# Patient Record
Sex: FEMALE | Race: WHITE | NOT HISPANIC OR LATINO | Employment: OTHER | URBAN - METROPOLITAN AREA
[De-identification: names, ages, dates, MRNs, and addresses within clinical notes are randomized per-mention and may not be internally consistent; named-entity substitution may affect disease eponyms.]

---

## 2023-04-11 LAB
ABO GROUP (TYPE) IN BLOOD: NORMAL
ALANINE AMINOTRANSFERASE (SGPT) (U/L) IN SER/PLAS: 12 U/L (ref 7–45)
ALBUMIN (G/DL) IN SER/PLAS: 4.2 G/DL (ref 3.4–5)
ALKALINE PHOSPHATASE (U/L) IN SER/PLAS: 54 U/L (ref 33–136)
ANION GAP IN SER/PLAS: 10 MMOL/L (ref 10–20)
ANTIBODY SCREEN: NORMAL
ASPARTATE AMINOTRANSFERASE (SGOT) (U/L) IN SER/PLAS: 14 U/L (ref 9–39)
BILIRUBIN TOTAL (MG/DL) IN SER/PLAS: 0.5 MG/DL (ref 0–1.2)
CALCIUM (MG/DL) IN SER/PLAS: 9.7 MG/DL (ref 8.6–10.3)
CARBON DIOXIDE, TOTAL (MMOL/L) IN SER/PLAS: 31 MMOL/L (ref 21–32)
CHLORIDE (MMOL/L) IN SER/PLAS: 102 MMOL/L (ref 98–107)
CREATININE (MG/DL) IN SER/PLAS: 0.66 MG/DL (ref 0.5–1.05)
ERYTHROCYTE DISTRIBUTION WIDTH (RATIO) BY AUTOMATED COUNT: 13.8 % (ref 11.5–14.5)
ERYTHROCYTE MEAN CORPUSCULAR HEMOGLOBIN CONCENTRATION (G/DL) BY AUTOMATED: 32.3 G/DL (ref 32–36)
ERYTHROCYTE MEAN CORPUSCULAR VOLUME (FL) BY AUTOMATED COUNT: 87 FL (ref 80–100)
ERYTHROCYTES (10*6/UL) IN BLOOD BY AUTOMATED COUNT: 5.07 X10E12/L (ref 4–5.2)
GFR FEMALE: >90 ML/MIN/1.73M2
GLUCOSE (MG/DL) IN SER/PLAS: 84 MG/DL (ref 74–99)
HEMATOCRIT (%) IN BLOOD BY AUTOMATED COUNT: 44.3 % (ref 36–46)
HEMOGLOBIN (G/DL) IN BLOOD: 14.3 G/DL (ref 12–16)
LEUKOCYTES (10*3/UL) IN BLOOD BY AUTOMATED COUNT: 6.9 X10E9/L (ref 4.4–11.3)
NRBC (PER 100 WBCS) BY AUTOMATED COUNT: 0 /100 WBC (ref 0–0)
PLATELETS (10*3/UL) IN BLOOD AUTOMATED COUNT: 244 X10E9/L (ref 150–450)
POTASSIUM (MMOL/L) IN SER/PLAS: 4 MMOL/L (ref 3.5–5.3)
PROTEIN TOTAL: 7.2 G/DL (ref 6.4–8.2)
RH FACTOR: NORMAL
SODIUM (MMOL/L) IN SER/PLAS: 139 MMOL/L (ref 136–145)
UREA NITROGEN (MG/DL) IN SER/PLAS: 15 MG/DL (ref 6–23)

## 2023-04-13 LAB — STAPH/MRSA SCREEN, CULTURE: NORMAL

## 2023-04-26 ENCOUNTER — HOSPITAL ENCOUNTER (OUTPATIENT)
Dept: DATA CONVERSION | Facility: HOSPITAL | Age: 73
End: 2023-04-28
Attending: ORTHOPAEDIC SURGERY | Admitting: ORTHOPAEDIC SURGERY
Payer: MEDICARE

## 2023-04-26 DIAGNOSIS — M85.862 OTHER SPECIFIED DISORDERS OF BONE DENSITY AND STRUCTURE, LEFT LOWER LEG: ICD-10-CM

## 2023-04-26 DIAGNOSIS — Z79.82 LONG TERM (CURRENT) USE OF ASPIRIN: ICD-10-CM

## 2023-04-26 DIAGNOSIS — M17.12 UNILATERAL PRIMARY OSTEOARTHRITIS, LEFT KNEE: ICD-10-CM

## 2023-04-26 DIAGNOSIS — M21.062 VALGUS DEFORMITY, NOT ELSEWHERE CLASSIFIED, LEFT KNEE: ICD-10-CM

## 2023-04-26 DIAGNOSIS — E66.01 MORBID (SEVERE) OBESITY DUE TO EXCESS CALORIES (MULTI): ICD-10-CM

## 2023-04-27 LAB
ANION GAP IN SER/PLAS: 11 MMOL/L (ref 10–20)
ANION GAP SERPL CALCULATED.3IONS-SCNC: 11 MMOL/L (ref 10–20)
BASOPHILS # BLD: 0.01 X10E9/L (ref 0–0.1)
BASOPHILS (10*3/UL) IN BLOOD BY AUTOMATED COUNT: 0.01 X10E9/L (ref 0–0.1)
BASOPHILS RELATIVE PERCENT: 0.1 % (ref 0–2)
BASOPHILS/100 LEUKOCYTES IN BLOOD BY AUTOMATED COUNT: 0.1 % (ref 0–2)
BICARBONATE: 29 MMOL/L (ref 21–32)
CALCIUM (MG/DL) IN SER/PLAS: 8.5 MG/DL (ref 8.6–10.3)
CALCIUM SERPL-MCNC: 8.5 MG/DL (ref 8.6–10.3)
CARBON DIOXIDE, TOTAL (MMOL/L) IN SER/PLAS: 29 MMOL/L (ref 21–32)
CHLORIDE (MMOL/L) IN SER/PLAS: 100 MMOL/L (ref 98–107)
CHLORIDE BLD-SCNC: 100 MMOL/L (ref 98–107)
CREAT SERPL-MCNC: 0.85 MG/DL (ref 0.5–1)
CREATININE (MG/DL) IN SER/PLAS: 0.85 MG/DL (ref 0.5–1.05)
EGFR FEMALE: 73 ML/MIN/1.73M2
EOSINOPHIL # BLD: 0 X10E9/L (ref 0–0.7)
EOSINOPHILS (10*3/UL) IN BLOOD BY AUTOMATED COUNT: 0 X10E9/L (ref 0–0.7)
EOSINOPHILS RELATIVE PERCENT: 0 % (ref 0–6)
EOSINOPHILS/100 LEUKOCYTES IN BLOOD BY AUTOMATED COUNT: 0 % (ref 0–6)
ERYTHROCYTE DISTRIBUTION WIDTH (RATIO) BY AUTOMATED COUNT: 13.7 % (ref 11.5–14.5)
ERYTHROCYTE MEAN CORPUSCULAR HEMOGLOBIN CONCENTRATION (G/DL) BY AUTOMATED: 32.5 G/DL (ref 32–36)
ERYTHROCYTE MEAN CORPUSCULAR VOLUME (FL) BY AUTOMATED COUNT: 89 FL (ref 80–100)
ERYTHROCYTE [DISTWIDTH] IN BLOOD BY AUTOMATED COUNT: 13.7 % (ref 11.5–14)
ERYTHROCYTES (10*6/UL) IN BLOOD BY AUTOMATED COUNT: 4 X10E12/L (ref 4–5.2)
GFR FEMALE: 73 ML/MIN/1.73M2
GLUCOSE (MG/DL) IN SER/PLAS: 114 MG/DL (ref 74–99)
GLUCOSE: 114 MG/DL (ref 74–99)
HCT VFR BLD CALC: 35.4 % (ref 36–46)
HEMATOCRIT (%) IN BLOOD BY AUTOMATED COUNT: 35.4 % (ref 36–46)
HEMOGLOBIN (G/DL) IN BLOOD: 11.5 G/DL (ref 12–16)
HEMOGLOBIN: 11.5 G/DL (ref 12–16)
IMMATURE GRANULOCYTES %: 0.4 % (ref 0–0.9)
IMMATURE GRANULOCYTES/100 LEUKOCYTES IN BLOOD BY AUTOMATED COUNT: 0.4 % (ref 0–0.9)
LEUKOCYTES (10*3/UL) IN BLOOD BY AUTOMATED COUNT: 13 X10E9/L (ref 4.4–11.3)
LYMPHOCYTES # BLD: 7.8 % (ref 13–44)
LYMPHOCYTES (10*3/UL) IN BLOOD BY AUTOMATED COUNT: 1.01 X10E9/L (ref 1.2–4.8)
LYMPHOCYTES RELATIVE PERCENT: 1.01 X10E9/L (ref 1.2–4.8)
LYMPHOCYTES/100 LEUKOCYTES IN BLOOD BY AUTOMATED COUNT: 7.8 % (ref 13–44)
MCHC RBC AUTO-ENTMCNC: 32.5 G/DL (ref 32–36)
MCV RBC AUTO: 89 FL (ref 80–100)
MONOCYTES # BLD: 1.41 X10E9/L (ref 0.1–1)
MONOCYTES (10*3/UL) IN BLOOD BY AUTOMATED COUNT: 1.41 X10E9/L (ref 0.1–1)
MONOCYTES RELATIVE PERCENT: 10.8 % (ref 2–10)
MONOCYTES/100 LEUKOCYTES IN BLOOD BY AUTOMATED COUNT: 10.8 % (ref 2–10)
NEUTROPHILS (10*3/UL) IN BLOOD BY AUTOMATED COUNT: 10.54 X10E9/L (ref 1.2–7.7)
NEUTROPHILS RELATIVE PERCENT: 80.9 % (ref 40–80)
NEUTROPHILS/100 LEUKOCYTES IN BLOOD BY AUTOMATED COUNT: 80.9 % (ref 40–80)
NEUTROPHILS: 10.54 X10E9/L (ref 1.2–7.7)
NRBC (PER 100 WBCS) BY AUTOMATED COUNT: 0 /100 WBC (ref 0–0)
NUCLEATED RBCS: 0 /100 WBC (ref 0–0)
PLATELET # BLD: 202 X10E9/L (ref 150–450)
PLATELETS (10*3/UL) IN BLOOD AUTOMATED COUNT: 202 X10E9/L (ref 150–450)
POTASSIUM (MMOL/L) IN SER/PLAS: 4.5 MMOL/L (ref 3.5–5.3)
POTASSIUM SERPL-SCNC: 4.5 MMOL/L (ref 3.5–5.3)
RBC # BLD: 4 X10E12/L (ref 4–5.2)
SODIUM (MMOL/L) IN SER/PLAS: 135 MMOL/L (ref 136–145)
SODIUM BLD-SCNC: 135 MMOL/L (ref 136–145)
UREA NITROGEN (MG/DL) IN SER/PLAS: 21 MG/DL (ref 6–23)
UREA NITROGEN: 21 MG/DL (ref 6–23)
WBC: 13 X10E9/L (ref 4.4–11.3)

## 2023-04-28 LAB
ANION GAP IN SER/PLAS: 10 MMOL/L (ref 10–20)
ANION GAP SERPL CALCULATED.3IONS-SCNC: 10 MMOL/L (ref 10–20)
BASOPHILS # BLD: 0.03 X10E9/L (ref 0–0.1)
BASOPHILS (10*3/UL) IN BLOOD BY AUTOMATED COUNT: 0.03 X10E9/L (ref 0–0.1)
BASOPHILS RELATIVE PERCENT: 0.3 % (ref 0–2)
BASOPHILS/100 LEUKOCYTES IN BLOOD BY AUTOMATED COUNT: 0.3 % (ref 0–2)
BICARBONATE: 27 MMOL/L (ref 21–32)
CALCIUM (MG/DL) IN SER/PLAS: 8.4 MG/DL (ref 8.6–10.3)
CALCIUM SERPL-MCNC: 8.4 MG/DL (ref 8.6–10.3)
CARBON DIOXIDE, TOTAL (MMOL/L) IN SER/PLAS: 27 MMOL/L (ref 21–32)
CHLORIDE (MMOL/L) IN SER/PLAS: 99 MMOL/L (ref 98–107)
CHLORIDE BLD-SCNC: 99 MMOL/L (ref 98–107)
CREAT SERPL-MCNC: 0.55 MG/DL (ref 0.5–1.05)
CREATININE (MG/DL) IN SER/PLAS: 0.55 MG/DL (ref 0.5–1.05)
EGFR FEMALE: >90 ML/MIN/1.73M2
EOSINOPHIL # BLD: 0.08 X10E9/L (ref 0–0.7)
EOSINOPHILS (10*3/UL) IN BLOOD BY AUTOMATED COUNT: 0.08 X10E9/L (ref 0–0.7)
EOSINOPHILS RELATIVE PERCENT: 0.8 % (ref 0–6)
EOSINOPHILS/100 LEUKOCYTES IN BLOOD BY AUTOMATED COUNT: 0.8 % (ref 0–6)
ERYTHROCYTE DISTRIBUTION WIDTH (RATIO) BY AUTOMATED COUNT: 14.1 % (ref 11.5–14.5)
ERYTHROCYTE MEAN CORPUSCULAR HEMOGLOBIN CONCENTRATION (G/DL) BY AUTOMATED: 32.1 G/DL (ref 32–36)
ERYTHROCYTE MEAN CORPUSCULAR VOLUME (FL) BY AUTOMATED COUNT: 89 FL (ref 80–100)
ERYTHROCYTE [DISTWIDTH] IN BLOOD BY AUTOMATED COUNT: 14.1 % (ref 11.5–14.5)
ERYTHROCYTES (10*6/UL) IN BLOOD BY AUTOMATED COUNT: 3.87 X10E12/L (ref 4–5.2)
GFR FEMALE: >90 ML/MIN/1.73M2
GLUCOSE (MG/DL) IN SER/PLAS: 100 MG/DL (ref 74–99)
GLUCOSE: 100 MG/DL (ref 74–99)
HCT VFR BLD CALC: 34.6 % (ref 36–46)
HEMATOCRIT (%) IN BLOOD BY AUTOMATED COUNT: 34.6 % (ref 36–46)
HEMOGLOBIN (G/DL) IN BLOOD: 11.1 G/DL (ref 12–16)
HEMOGLOBIN: 11.1 G/DL (ref 12–16)
IMMATURE GRANULOCYTES %: 0.4 % (ref 0–0.9)
IMMATURE GRANULOCYTES/100 LEUKOCYTES IN BLOOD BY AUTOMATED COUNT: 0.4 % (ref 0–0.9)
LEUKOCYTES (10*3/UL) IN BLOOD BY AUTOMATED COUNT: 10 X10E9/L (ref 4.4–11.3)
LYMPHOCYTES # BLD: 16.2 % (ref 13–44)
LYMPHOCYTES (10*3/UL) IN BLOOD BY AUTOMATED COUNT: 1.62 X10E9/L (ref 1.2–4.8)
LYMPHOCYTES RELATIVE PERCENT: 1.62 X10E9/L (ref 1.2–4.8)
LYMPHOCYTES/100 LEUKOCYTES IN BLOOD BY AUTOMATED COUNT: 16.2 % (ref 13–44)
MCHC RBC AUTO-ENTMCNC: 32.1 G/DL (ref 32–36)
MCV RBC AUTO: 89 FL (ref 80–100)
MONOCYTES # BLD: 1.26 X10E9/L (ref 0.1–1)
MONOCYTES (10*3/UL) IN BLOOD BY AUTOMATED COUNT: 1.26 X10E9/L (ref 0.1–1)
MONOCYTES RELATIVE PERCENT: 12.6 % (ref 2–10)
MONOCYTES/100 LEUKOCYTES IN BLOOD BY AUTOMATED COUNT: 12.6 % (ref 2–10)
NEUTROPHILS (10*3/UL) IN BLOOD BY AUTOMATED COUNT: 7 X10E9/L (ref 1.2–7.7)
NEUTROPHILS RELATIVE PERCENT: 69.7 % (ref 40–80)
NEUTROPHILS/100 LEUKOCYTES IN BLOOD BY AUTOMATED COUNT: 69.7 % (ref 40–80)
NEUTROPHILS: 7 X10E9/L (ref 1.2–7.7)
NRBC (PER 100 WBCS) BY AUTOMATED COUNT: 0 /100 WBC (ref 0–0)
NUCLEATED RBCS: 0 /100 WBC (ref 0–0)
PLATELET # BLD: 173 X10E9/L (ref 150–450)
PLATELETS (10*3/UL) IN BLOOD AUTOMATED COUNT: 173 X10E9/L (ref 150–450)
POTASSIUM (MMOL/L) IN SER/PLAS: 4.1 MMOL/L (ref 3.5–5.3)
POTASSIUM SERPL-SCNC: 4.1 MMOL/L (ref 3.5–5.3)
RBC # BLD: 3.87 X10E12/L (ref 4–5.2)
SODIUM (MMOL/L) IN SER/PLAS: 132 MMOL/L (ref 136–145)
SODIUM BLD-SCNC: 132 MMOL/L (ref 136–145)
UREA NITROGEN (MG/DL) IN SER/PLAS: 15 MG/DL (ref 6–23)
UREA NITROGEN: 15 MG/DL (ref 6–23)
WBC: 10 X10E9/L (ref 4.4–11.3)

## 2023-04-29 LAB
ANION GAP IN SER/PLAS: NORMAL
BASOPHILS (10*3/UL) IN BLOOD BY AUTOMATED COUNT: NORMAL
BASOPHILS/100 LEUKOCYTES IN BLOOD BY AUTOMATED COUNT: NORMAL
CALCIUM (MG/DL) IN SER/PLAS: NORMAL
CARBON DIOXIDE, TOTAL (MMOL/L) IN SER/PLAS: NORMAL
CHLORIDE (MMOL/L) IN SER/PLAS: NORMAL
CREATININE (MG/DL) IN SER/PLAS: NORMAL
EOSINOPHILS (10*3/UL) IN BLOOD BY AUTOMATED COUNT: NORMAL
EOSINOPHILS/100 LEUKOCYTES IN BLOOD BY AUTOMATED COUNT: NORMAL
ERYTHROCYTE DISTRIBUTION WIDTH (RATIO) BY AUTOMATED COUNT: NORMAL
ERYTHROCYTE MEAN CORPUSCULAR HEMOGLOBIN CONCENTRATION (G/DL) BY AUTOMATED: NORMAL
ERYTHROCYTE MEAN CORPUSCULAR VOLUME (FL) BY AUTOMATED COUNT: NORMAL
ERYTHROCYTES (10*6/UL) IN BLOOD BY AUTOMATED COUNT: NORMAL
GFR FEMALE: NORMAL
GFR MALE: NORMAL
GLUCOSE (MG/DL) IN SER/PLAS: NORMAL
HEMATOCRIT (%) IN BLOOD BY AUTOMATED COUNT: NORMAL
HEMOGLOBIN (G/DL) IN BLOOD: NORMAL
IMMATURE GRANULOCYTES/100 LEUKOCYTES IN BLOOD BY AUTOMATED COUNT: NORMAL
LEUKOCYTES (10*3/UL) IN BLOOD BY AUTOMATED COUNT: NORMAL
LYMPHOCYTES (10*3/UL) IN BLOOD BY AUTOMATED COUNT: NORMAL
LYMPHOCYTES/100 LEUKOCYTES IN BLOOD BY AUTOMATED COUNT: NORMAL
MANUAL DIFFERENTIAL Y/N: NORMAL
MONOCYTES (10*3/UL) IN BLOOD BY AUTOMATED COUNT: NORMAL
MONOCYTES/100 LEUKOCYTES IN BLOOD BY AUTOMATED COUNT: NORMAL
NEUTROPHILS (10*3/UL) IN BLOOD BY AUTOMATED COUNT: NORMAL
NEUTROPHILS/100 LEUKOCYTES IN BLOOD BY AUTOMATED COUNT: NORMAL
NRBC (PER 100 WBCS) BY AUTOMATED COUNT: NORMAL
PLATELETS (10*3/UL) IN BLOOD AUTOMATED COUNT: NORMAL
POTASSIUM (MMOL/L) IN SER/PLAS: NORMAL
SODIUM (MMOL/L) IN SER/PLAS: NORMAL
UREA NITROGEN (MG/DL) IN SER/PLAS: NORMAL

## 2023-09-07 VITALS — WEIGHT: 265.88 LBS | HEIGHT: 64 IN | BODY MASS INDEX: 45.39 KG/M2

## 2023-09-14 NOTE — PROGRESS NOTES
Service: Orthopaedics     Subjective Data:   MARISSA FARAH is a 72 year old Female who is Hospital Day # 1.    Additional Information:    Ms. Farah was to stand but side her bed today while working with therapy.  She complains of mild left knee discomfort, denies chest pain, shortness of breath,  nausea.    Objective Data:     Objective Information:      T   P  R  BP   MAP  SpO2   Value  36  66  18  110/60      95%  Date/Time 4/26 11:20 4/26 11:20 4/26 11:20 4/26 11:20    4/26 11:20  Range  (36C - 36C )  (66 - 66 )  (18 - 18 )  (110 - 110 )/ (60 - 60 )    (95% - 95% )      Pain reported at 4/26 11:20: 3 = Mild    ---- Intake and Output  -----  Mn/Dy/Year Time  Intake   Output  Net  Apr 26, 2023 2:00 pm  1250   50  1200      Physical Exam by System:    Constitutional: Well developed, awake/alert, no distress,  alert and cooperative   Eyes: EOMI, clear sclera   ENMT: mucous membranes moist, no lesions seen   Respiratory/Thorax: Patent airways, with good chest  expansion, thorax symmetric   Musculoskeletal: left knee dressing dry and intact.    Bilateral lower extremities distally with intact dorsiflexion/plantarflexion/EHL.  DP palpable 2+/2   Neurological: alert,  intact senses   Lymphatic: No significant lymphadenopathy   Psychological: Appropriate mood and behavior     Medication:    Medications:          Continuous Medications       --------------------------------    1. Lactated Ringers Infusion:  1000  mL  IntraVenous  <Continuous>         Scheduled Medications       --------------------------------    1. Acetaminophen:  650  mg  Oral  Every 6 Hours    2. Aspirin Enteric Coated:  81  mg  Oral  2 Times a Day    3. ceFAZolin 3 gram/ D5W 100 mL.:  100  mL  IntraVenous Piggyback  Every 8 Hours    4. Docusate:  100  mg  Oral  2 Times a Day    5. Polyethylene Glycol:  17  gram(s)  Oral  2 Times a Day         PRN Medications       --------------------------------    1. Cyclobenzaprine:  10  mg  Oral  3  Times a Day    2. diphenhydrAMINE:  25  mg  Oral  Every 6 Hours    3. diphenhydrAMINE Injectable:  12.5  mg  IntraVenous Push  Every 6 Hours    4. Ketorolac Injectable:  15  mg  IntraVenous Push  Every 6 Hours    5. Metoclopramide Injectable:  5  mg  IntraVenous Push  Every 6 Hours    6. Morphine Injectable:  2  mg  IntraVenous Push  Every 4 Hours    7. Ondansetron Injectable:  4  mg  IntraVenous Push  Every 6 Hours    8. oxyCODONE Immediate Release:  5  mg  Oral  Every 4 Hours    9. oxyCODONE Immediate Release:  10  mg  Oral  Every 4 Hours    10. Sore Throat Lozenge:  1  lozenge(s)  Oral  Every 4 Hours        Assessment and Plan:   Comorbidities:  ·  Comorbidity obesity   ·  Obesity morbid obesity (BMI 40+)   ·  BMI 45.67     Code Status:  ·  Code Status Full Code       Impression 1: Left knee osteoarthritis   Plan for Impression 1: Postop check status post left  total knee arthroplasty  PT/OT, weightbearing as tolerated left lower extremity  Pain regimen: Multimodal pain medications ordered  Bowel regimen: Colace and MiraLAX  BMP and CBC in a.m.  VTE prophylaxis: Aspirin 81 mg twice daily and SCDs   disposition: When appropriate, will discharge home with home health care, face-to-face is completed       Electronic Signatures:  Ashley Mason (PAC)  (Signed 26-Apr-2023 15:46)   Authored: Service, Subjective Data, Objective Data, Assessment  and Plan, Note Completion      Last Updated: 26-Apr-2023 15:46 by Ashley Mason (PAC)

## 2023-09-14 NOTE — PROGRESS NOTES
Service: Orthopaedics     Subjective Data:   MARISSA FARAH is a 72 year old Female who is Hospital Day # 3 and POD #2 for 1. LEFT CEMENTED TOTAL KNEE ARTHROPLASTY*** TO ASSIST***;     Patient states overall doing much better today.  Able to fully participate in physical and occupational therapy.  Using incentive spirometry.  Tolerating a diet with no nausea vomiting.  No complaints  of chest pain, shortness of breath, lightheaded or dizziness.  Plans on discharge to home with home health care.    Objective Data:     Objective Information:      T   P  R  BP   MAP  SpO2   Value  36.5  82  17  165/84   96  96%  Date/Time 4/28 8:00 4/28 8:00 4/28 8:00 4/28 8:00  4/27 16:00 4/28 8:00  Range  (36.3C - 36.6C )  (71 - 87 )  (16 - 20 )  (130 - 165 )/ (69 - 84 )  (84 - 96 )  (93% - 96% )      Pain reported at 4/28 8:00: 4 = Moderate    ---- Intake and Output  -----  Mn/Dy/Year Time  Intake   Output  Net  Apr 28, 2023 6:00 am  0   0  0  Apr 27, 2023 10:00 pm  0   0  0  Apr 27, 2023 2:00 pm  1450   0  1450    The Intake and Output Totals for the last 24 hours are:      Intake   Output  Net      1450   null  null    Physical Exam by System:    Constitutional: Well developed, awake/alert/oriented  x3, no distress, alert and cooperative   ENMT: mucous membranes moist   Respiratory/Thorax: Chest symmetric, no apparent  respiratory distress   Gastrointestinal: Nondistended, soft, non-tender   Extremities: Left knee with polar ice machine on,  dressing beneath is clean dry and intact, supple swelling appreciated left lower extremity, plantar and dorsiflexion against resistance present in left foot, DP pulse palpable left foot   Neurological: alert and oriented x3, intact senses   Psychological: Appropriate mood and behavior   Skin: Warm and dry     Medication:    Medications:      CENTRAL NERVOUS SYSTEM AGENTS:    1. Acetaminophen:  650  mg  Oral  Every 6 Hours    2. Aspirin Enteric Coated:  81  mg  Oral  2 Times  a Day    3. Morphine Injectable:  2  mg  IntraVenous Push  Every 4 Hours   PRN       4. oxyCODONE Immediate Release:  5  mg  Oral  Every 4 Hours   PRN       5. oxyCODONE Immediate Release:  10  mg  Oral  Every 4 Hours   PRN       6. Ondansetron Injectable:  4  mg  IntraVenous Push  Every 6 Hours   PRN       7. Cyclobenzaprine:  10  mg  Oral  3 Times a Day   PRN         GASTROINTESTINAL AGENTS:    1. Metoclopramide Injectable:  5  mg  IntraVenous Push  Every 6 Hours   PRN       2. Docusate:  100  mg  Oral  2 Times a Day    3. Polyethylene Glycol:  17  gram(s)  Oral  2 Times a Day      RESPIRATORY AGENTS:    1. diphenhydrAMINE:  25  mg  Oral  Every 6 Hours   PRN       2. diphenhydrAMINE Injectable:  12.5  mg  IntraVenous Push  Every 6 Hours   PRN         TOPICAL AGENTS:    1. Sore Throat Lozenge:  1  lozenge(s)  Oral  Every 4 Hours   PRN         Recent Lab Results:    Results:    CBC: 4/28/2023 05:48              \     Hgb     /                              \     11.1 L    /  WBC  ----------------  Plt               10.0       ----------------    173              /     Hct     \                              /     34.6 L    \            RBC: 3.87 L    MCV: 89     Neutrophil %: 69.7      BMP: 4/28/2023 05:48  NA+        Cl-     BUN  /                         132 L   99    15  /  --------------------------------  Glucose                ---------------------------  100 H    K+     HCO3-   Creat \                         4.1  27    0.55  \  Calcium : 8.4 L    Anion Gap : 10      Assessment and Plan:   Code Status:  ·  Code Status Full Code       Impression 1: Osteoarthritis left knee   Plan for Impression 1: Failed outpatient conservative  medical management postop day # 2 of a left total knee replacement  Physical and Occupational Therapy are on consult  Weightbearing as tolerated  ASA for VTE prophylaxis  Pain controlled with current pain regime  Bowel regime  Appropriate home medications ordered for chronic  medical conditions  Labs reviewed  Encourage use of incentive spirometry  Plans on discharge to home with home health care  Follow-up with Dr. Harper as outpatient after discharge       Electronic Signatures:  Marta Saxena (PAC)  (Signed 28-Apr-2023 10:16)   Authored: Service, Subjective Data, Objective Data, Assessment  and Plan, Note Completion      Last Updated: 28-Apr-2023 10:16 by Marta Saxena (PAC)

## 2023-09-14 NOTE — PROGRESS NOTES
Service: Orthopaedics     Subjective Data:   MARISSA FARAH is a 72 year old Female who is Hospital Day # 2 and POD #1 for 1. LEFT CEMENTED TOTAL KNEE ARTHROPLASTY*** TO ASSIST***;    Additional Information:    Patient's pain is under control.  She tolerating oral diet.  Denies any chest pain, shortness of breath or lightheadedness.    Objective Data:     Objective Information:      T   P  R  BP   MAP  SpO2   Value  37  60  18  155/75   97  96%  Date/Time 4/27 0:00 4/27 0:00 4/27 0:00 4/27 0:00  4/26 16:00 4/27 0:00  Range  (36C - 37C )  (56 - 66 )  (16 - 18 )  (110 - 157 )/ (60 - 89 )  (97 - 97 )  (95% - 97% )  Highest temp of 37 C was recorded at 4/27 0:00      Pain reported at 4/27 4:35: 2 = Mild    ---- Intake and Output  -----  Mn/Dy/Year Time  Intake   Output  Net  Apr 27, 2023 6:00 am  0   0  0  Apr 26, 2023 10:00 pm  360   0  360  Apr 26, 2023 2:00 pm  1250   50  1200    The Intake and Output Totals for the last 24 hours are:      Intake   Output  Net      1610   50  1560      Resting comfortably.  Alert and oriented x3.  No acute distress.  Breath sounds equally bilaterally.  Regular rate and rhythm.  Abdomen soft nontender nondistended.  Left lower extremity dressing intact.  Ankle dorsiflexion, ankle plantarflexion extensor hallux longus motor function 5-5.  Peroneal and tibial sensory distribution are intact.  Digits well-perfused brisk capillary refill.  No calf pain bilaterally.  Negative Homans' sign bilaterally.    Recent Lab Results:    Results:    CBC: 4/27/2023 05:27              \     Hgb     /                              \     11.5 L    /  WBC  ----------------  Plt               13.0 H    ----------------    202              /     Hct     \                              /     35.4 L    \            RBC: 4.00     MCV: 89     Neutrophil %: 80.9      BMP: 4/27/2023 05:27  NA+        Cl-     BUN  /                         135 L   100    21  /  --------------------------------   Glucose                ---------------------------  114 H    K+     HCO3-   Creat \                         4.5  29    0.85  \  Calcium : 8.5 L    Anion Gap : 11      Assessment and Plan:   Code Status:  ·  Code Status Full Code     Advance Care Planning:  Advance Care Planning: Patient didn't wish to or  was unable to provide advance care plan     Assessment:    Assessment:  Doing well postoperative day #1 status post left cemented total knee arthroplasty.    Plan:  Discussed rehabilitation and plan with the patient.  Physical therapy  Complete antibiotic dosing.  Aspirin for DVT prophylaxis  We will see how she does with therapy today and possible discharge home later today.  Home physical therapy.      Electronic Signatures:  Karthik Harper)  (Signed 27-Apr-2023 07:54)   Authored: Service, Subjective Data, Objective Data, Assessment  and Plan, Note Completion      Last Updated: 27-Apr-2023 07:54 by Karthik Harper)

## 2023-09-14 NOTE — DISCHARGE SUMMARY
Send Summary:   Discharge Summary Providers:  Provider Role Provider Name   · Attending Karthik Harper   · Referring Karthik Harper   · Primary Tristan Oneill       Note Recipients: Karthik Harper MD       Discharge:    Summary:   Admission Date: .26-Apr-2023 06:16:00   Discharge Date: 28-Apr-2023   Attending Physician at Discharge: Karthik Harper   Admission Reason: Primary localized osteoarthritis  of left knee   Final Discharge Diagnoses: Primary localized osteoarthritis  of left knee   Procedures: Date: 26-Apr-2023 16:30:00  Procedure Name: 1. LEFT CEMENTED TOTAL KNEE ARTHROPLASTY*** TO ASSIST***   Condition at Discharge: Satisfactory   Disposition at Discharge: Home Health Care - New   Vital Signs:        T   P  R  BP   MAP  SpO2   Value  36.5  82  17  165/84   96  96%  Date/Time 4/28 8:00 4/28 8:00 4/28 8:00 4/28 8:00  4/27 16:00 4/28 8:00  Range  (36.3C - 36.6C )  (71 - 87 )  (16 - 20 )  (130 - 165 )/ (69 - 84 )  (84 - 96 )  (93% - 96% )    Date:            Weight/Scale Type:  Height:   26-Apr-2023 11:55  120.6  kg / standing  162.5  cm  Hospital Course:     admitted on April 26, 2023 with severe osteoarthritis of the left knee postop day number 2 of cemented left total knee replacement.  Patient had satisfactory  postop course with no major complications.  Expected postop blood loss and anemia.  Pain was being controlled with p.o. pain medications.  Patient fully participated in physical and Occupational Therapy.  Was compliant with incentive spirometry use.   Appropriate VTE prophylaxis if indicated.  Medical problems managed with home medications.  The patient was instructed in the use of pain medication, the signs and of infection, and was given our number to call should they have any questions or concerns  following discharge. Discharged to home in satisfactory condition with home health care.      Discharge Information:    and Continuing Care:   Lab Results - Pending:     None  Radiology Results - Pending: None   Discharge Instructions:    Activity:           activity as tolerated.          May shower..            May not return to school/work until follow-up visit with.            May not drive until follow-up visit.  or while taking narcotic pain medication          No pushing, pulling, or lifting objects greater than 10 pounds until follow-up visit.            Weight-bearing Instructions: full weight bearing left leg.      Nutrition/Diet:           regular,  resume normal diet    Wound Care:           Wound Site:   Left knee          Wound Type:   surgical incision          Change Dressing:   a week          Cleanse With:   soap and water          Cover With:   Mepilex          Instructions:   no lotions, creams, or tub soaks          Other Instructions:   Remove surgical dressing post op day #7 and replace with a new dressing and leave in place for an additional 7 days.    Additional Orders:           Additional Instructions:   Call Dr. Harper for any problems and/or concerns.  *Full weight bearing  *Maintain knee precautions  *No pillow under affected knee  *Raise (elevate) your leg above the level of your heart while you are sitting or lying down (place pillow underneath calf)  *You may shower, do not soak or scrub incision; pat dry  *Change mepilex dressing post operative day #7 to new dressing  *If you have a polar care cooling device, use as instructed; otherwise  Apply ice to affected knee as needed to minimize swelling, on 20 minutes, off 20 minutes  *Move your toes often to avoid stiffness and to lessen swelling  *Avoid sitting for a long time without moving. Get up to take short walks every 1-2 hours. This is important to improve blood flow and breathing. Ask for help if you feel weak or unsteady  *Continue the coughing and deep breathing exercises that you learned in the hospital    Call Doctor right away for:  *Fever of 100.4 or above, shaking chills  *Stiffness, or  inability to move the knee  *Increased swelling in your leg  *Increased redness, tenderness, or swelling in or around the knee incision  *Drainage from the knee incision  *Increased knee pain  *An incision that breaks open  *Chest pain/shortness of breath-call 911    After the procedure you can expect pain, swelling, and limited range of motion    Narcotic pain medication may cause constipation. You may need to take actions to prevent or treat constipation, such as:  -drink enough fluid to keep your urine pale yellow  -take over-the-counter or prescription medicines  -eat foods that are high in fiber, such as beans, whole grains, and fresh fruits and vegetables  -limit foods that are high in fat and processed sugars, such as fried or sweet foods    Take your blood thinner (anticoagulant) as prescribed by your physician to prevent blood clots.   If your physician prescribed MAGDY Hose (compression stockings)-wear as instructed as they will help reduce swelling and the formation of blood clots    Do not use any products that contain nicotine or tobacco, such as cigarettes, e-cigarettes, and chewing tobacco. These can delay healing after surgery. If you need help quitting, ask your health care provider    If you were given a knee immobilizer, please take it home with you and keep it handy for at least 4 weeks just in case any issues arise (i.e. knee buckling).    Home Care Certification:           Home Care Agency:   Other (with phone number)   [facility] [phone]          Skilled Disciplines Ordered:   PT,  OT    Home Care Services:           Home Care Skilled Service:   Rehab (PT/OT/SP eval and treat)    Follow Up Appointments:    Follow-Up Appointment 01:           Physician/Dept/Service:   Dr. Harper          Reason for Referral:   post op visit/incision check          Call to Schedule in:   2 weeks          Phone Number:   [phone]    Discharge Medications: Home Medication   aspirin 81 mg oral delayed release  tablet - 1 tab(s) orally 2 times a day for 30 days then resume once daily dosing  docusate sodium 100 mg oral capsule - 1 cap(s) orally 2 times a day while taking narcotic pain medication  polyethylene glycol 3350 oral powder for reconstitution - 17 gram(s) orally 2 times a day while taking narcotic pain medication     PRN Medication   oxyCODONE 5 mg oral tablet - 1 tab(s) orally every 6 hours, As Needed -for pain   acetaminophen 325 mg oral tablet - 2 tab(s) orally every 6 hours, As Needed for pain     DNR Status:   ·  Code Status Code Status order at time of discharge: Full Code       Electronic Signatures:  Marta Saxena (PAC)  (Signed 28-Apr-2023 10:40)   Authored: Send Summary, Summary Content, Ongoing Care,  DNR Status, Note Completion      Last Updated: 28-Apr-2023 10:40 by Marta Saxena (PAC)

## 2023-10-02 NOTE — OP NOTE
PROCEDURE DETAILS    Preoperative Diagnosis:  Primary localized osteoarthritis of left knee, M17.12    Postoperative Diagnosis:  Left Knee, Primary osteoarthritis-valgus knee  Surgeon: Karthik Harper  Resident/Fellow/Other Assistant: Roger Driver    Procedure:  1. LEFT CEMENTED TOTAL KNEE ARTHROPLASTY*** TO ASSIST***    Anesthesia: Jonathan Plasencia  Estimated Blood Loss: 100 mL  Blood Replaced: None    Findings: Morbid obesity-BMI over 45  Severe valgus deformity of the left knee with erosion of the lateral tibial plateau  Specimens(s) Collected: no,     Complications: None    IV Fluids: 1000 ml  Urine Output: Not applicable    Drains and/or Catheters: Not applicable    Implants: Bree triathlon total knee arthroplasty-femur size 5 CR, tibia size 5 universal baseplate, 14 mm CS insert, 32 patella  All components cemented in  Tourniquet Times: 70 minutes  Patient Returned To/Condition: PACU in stable condition          Operative Report:   INDICATIONS FOR SURGERY:    The patient is a 72-year-old female who presented with increasing  pain and discomfort in both her knees.  The left was worse than the  Right.  There was severe valgus deformity present in the left knee with erosion of the lateral tibial plateau.     She tried all conservative measures and these had failed.  The risks and benefits of a total knee replacement have been  discussed in detail.  The patient is obese, BMI over 41.  And the risks and benefits  were carefully explained to her.  The risks of anesthesia; risk of injury to the vessel, nerve, or  ligament; fractures; postoperative complications such as hematoma  formation, infection, continued pain in the knee, restricted range of  motion, failure of the extensor expansion, DVT, PE, complications  from any medical condition, osteolysis, loosening, failure of the  implants, prolonged period of rehab and therapy, stiffness of the  knee have all been discussed.   Need for blood transfusion was also  explained.  The patient understands this and wishes to go ahead with  the procedure.       OPERATIVE NOTE:    The patient was brought to the operating room and spinal anesthetic  was given by the Anesthetic Services.  The patient was positioned  supine.  All bony prominences were well padded.  Left lower extremity  was prepped and draped in the usual sterile fashion.  Preoperative IV  antibiotics in the form of 2 g of Ancef was given prior to the start  of the procedure.  A tourniquet was applied and elevated at 300 mmHg.   The limb was exsanguinated.  A time-out was called prior to the  procedure.   DVT prophylaxis will begin postoperatively in the form of SCD devices and aspirin.  Early  mobilization will be encouraged.  The patient was also given 2 g of  TXA prior to start of the procedure.    A midline incision had been marked.  An Ioban drape was used.  The  limb was exsanguinated.  The patient had significant laxity on the   medial side as well as hyperextension and posterior instability.  Skin and subcutaneous tissue were carefully  cut and retracted.  The patient was   obese and care was  taken to avoid any damage to the soft tissue structures.  An extra  dissection and clearance were required to obtain good visualization  of the total knee.  Minimal medial release was done due to the valgus deformity.    Remnants of the meniscus both medially and laterally were resected.  A retractor was  placed laterally and collateral ligaments being protected.  The knee  was gently flexed and brought into view.  There was a large significant deformity over the lateral tibial plateau  the  knee was repeatedly  irrigated with irrigating solution.  The knee was irrigated with  dilute Betadine solution.  Collateral ligaments were protected.  The  intramedullary drill hole was drilled into the femur.  Distal femur  was resected resecting 10 mm of bone.  The distal femoral cut was carefully  checked.  The bone was noted to  be firm.  The femur was now sized and noted to accommodate a size 5.  The size 5 cutting guide was placed in about 3 degrees of external  rotation.  The anterior-posterior cut and the chamfer cuts were made.   The size 5 femoral component gave a good fit.  The trial component  was removed.  No notching of the femur was present upon completion of the femoral cuts    Attention was now turned towards the tibia.  This was subluxated  anteriorly.  Extramedullary guide was placed and the proximal tibia  was carefully cut perpendicular to the long axis.  The alignment was  checked and required small amount of resection on the lateral side.  The 14 mm spacer block gave good stability both in flexion and  extension.  The tibia was now sized and noted to accommodate a size  5.  The component was placed in external rotation.  The proximal  tibia was prepared.  The trial reduction was done with a size 5 tibial component  followed by the 14 mm insert and a size 5 femoral component and  excellent fit was obtained.  The knee was put through its full range  of motion, noted to be stable and entirely satisfactory.  The patella  was carefully everted.  The thickness measured 22 mm decompressed  using a fan-shaped blade.  The subsequent thickness measured about 12  mm.  The patella was placed in position. It Measured size 32. Subsequent final thickness  was 24 mm.  All the trial components were removed.  The patella was  tracking well.  Very minimal lateral release in the midportion, of the lateral retinaculum was done    Knee was irrigated out completely.  The irrigation consisting of Betadine  solution was also used.  Tibia subluxated anteriorly.  Proximal tibia  was prepared and the it was kept clean and dry.  It was Pulsavac'd.  Lamina spreaders had been used posteriorly and osteophytes along the  medial and lateral side had been removed.  The PCL was protected preparing the  preparation of the  tibia.  However, it was noted to be insufficient  and therefore CS insert was decided upon.  In the proximal tibia was prepared attention   was made towards preparation of the small tibial stem.    Cementing of the components was done beginning with the tibia  followed by the femur and the patella.  The size 14 insert was locked into  place.  The knee was brought to full extension until the cement had  set.  Excess cement was carefully removed.  The knee was irrigated  out once again after the cement had dried.  The knee was once again  checked for stability and noted to be satisfactory.  It was put  through its full range of motion.  The quadriceps tendon was repaired  with Ethibond.  A size 15-Vietnamese drain was used.  Subcutaneous layers  were closed with 2-0 Vicryl.  Skin was reapproximated with Stratafix and  Mepilex dressing were applied.  Webril and an Ace wrap were applied.  The patient will made a satisfactory recovery, she will  begin mobilizing full weightbearing on the knee and will be  periodically evaluated.  Postoperative x-rays will also be taken.    This case could be termed a complex case due to the following reasons.  1.  The patient's morbid obesity, BMI of 41 required careful retraction of all the soft tissues, making the exposure difficult  2.  Deformity in her knee was severely valgus with erosion of the lateral tibial plateau, there was ligamentous instability  on the medial side as well as posteriorly  3.  Retraction, and positioning of the implants required extra care, increased presence of soft tissues as well as the pre-existing deformities.    The patient had mixture of ropivacaine, epinephrine, clonidine, Toradol given into the posterior capsule along the medial and lateral collateral ligaments, as well as the prepatellar fat of fat.  Further injection was also given into the subcutaneous  plane and in the suprapatellar fossa.  Approximately 100 cc of this mixture was infiltrated into the  soft tissues.  Care was taken to avoid the injection close to any major vessel or nerve.      The role of the assistants involved were in prepping of the involved lower extremity and positioning the patient.  During the surgical procedure retraction of the soft tissues were was done by then.  Manipulation of the knee was required during placement  of the implants.  They were also involved in closure of the deeper structures,  subcutaneous layers as well as the skin.  Application of the dressings as well as moving the patient from the operating table to the regular bed.  No qualified resident was available to assist in this case                        Attestation:   Note Completion:  Attending Attestation I performed the procedure without a resident         Electronic Signatures:  Andressa Chavez)  (Signed 26-Apr-2023 21:40)   Authored: Post-Operative Note, Chart Review, Note Completion      Last Updated: 26-Apr-2023 21:40 by Andressa Chavez)

## 2024-01-10 ENCOUNTER — LAB (OUTPATIENT)
Dept: LAB | Facility: LAB | Age: 74
End: 2024-01-10
Payer: MEDICARE

## 2024-01-10 ENCOUNTER — PRE-ADMISSION TESTING (OUTPATIENT)
Dept: PREADMISSION TESTING | Facility: HOSPITAL | Age: 74
End: 2024-01-10
Payer: MEDICARE

## 2024-01-10 VITALS
RESPIRATION RATE: 18 BRPM | WEIGHT: 276.68 LBS | SYSTOLIC BLOOD PRESSURE: 168 MMHG | BODY MASS INDEX: 46.1 KG/M2 | OXYGEN SATURATION: 94 % | HEIGHT: 65 IN | TEMPERATURE: 97.9 F | DIASTOLIC BLOOD PRESSURE: 71 MMHG | HEART RATE: 78 BPM

## 2024-01-10 DIAGNOSIS — M17.11 OSTEOARTHRITIS OF RIGHT KNEE, UNSPECIFIED OSTEOARTHRITIS TYPE: ICD-10-CM

## 2024-01-10 DIAGNOSIS — Z01.818 PRE-OP TESTING: ICD-10-CM

## 2024-01-10 DIAGNOSIS — Z01.818 PRE-OP TESTING: Primary | ICD-10-CM

## 2024-01-10 LAB
ANION GAP SERPL CALC-SCNC: 11 MMOL/L (ref 10–20)
BUN SERPL-MCNC: 16 MG/DL (ref 6–23)
CALCIUM SERPL-MCNC: 9.5 MG/DL (ref 8.6–10.3)
CHLORIDE SERPL-SCNC: 105 MMOL/L (ref 98–107)
CO2 SERPL-SCNC: 29 MMOL/L (ref 21–32)
CREAT SERPL-MCNC: 0.66 MG/DL (ref 0.5–1.05)
EGFRCR SERPLBLD CKD-EPI 2021: >90 ML/MIN/1.73M*2
ERYTHROCYTE [DISTWIDTH] IN BLOOD BY AUTOMATED COUNT: 14.1 % (ref 11.5–14.5)
GLUCOSE SERPL-MCNC: 95 MG/DL (ref 74–99)
HCT VFR BLD AUTO: 45.8 % (ref 36–46)
HGB BLD-MCNC: 14.5 G/DL (ref 12–16)
MCH RBC QN AUTO: 28.1 PG (ref 26–34)
MCHC RBC AUTO-ENTMCNC: 31.7 G/DL (ref 32–36)
MCV RBC AUTO: 89 FL (ref 80–100)
NRBC BLD-RTO: 0 /100 WBCS (ref 0–0)
PLATELET # BLD AUTO: 265 X10*3/UL (ref 150–450)
POTASSIUM SERPL-SCNC: 4.4 MMOL/L (ref 3.5–5.3)
RBC # BLD AUTO: 5.16 X10*6/UL (ref 4–5.2)
SODIUM SERPL-SCNC: 141 MMOL/L (ref 136–145)
WBC # BLD AUTO: 7.1 X10*3/UL (ref 4.4–11.3)

## 2024-01-10 PROCEDURE — 80048 BASIC METABOLIC PNL TOTAL CA: CPT

## 2024-01-10 PROCEDURE — 87081 CULTURE SCREEN ONLY: CPT | Mod: STJLAB | Performed by: ORTHOPAEDIC SURGERY

## 2024-01-10 PROCEDURE — 93010 ELECTROCARDIOGRAM REPORT: CPT | Performed by: INTERNAL MEDICINE

## 2024-01-10 PROCEDURE — 36415 COLL VENOUS BLD VENIPUNCTURE: CPT

## 2024-01-10 PROCEDURE — 85027 COMPLETE CBC AUTOMATED: CPT

## 2024-01-10 PROCEDURE — 93005 ELECTROCARDIOGRAM TRACING: CPT

## 2024-01-10 PROCEDURE — 99204 OFFICE O/P NEW MOD 45 MIN: CPT | Performed by: NURSE PRACTITIONER

## 2024-01-10 RX ORDER — CHLORHEXIDINE GLUCONATE ORAL RINSE 1.2 MG/ML
SOLUTION DENTAL
Qty: 473 ML | Refills: 0 | Status: SHIPPED | OUTPATIENT
Start: 2024-01-10 | End: 2024-01-25 | Stop reason: HOSPADM

## 2024-01-10 RX ORDER — LANOLIN ALCOHOL/MO/W.PET/CERES
1000 CREAM (GRAM) TOPICAL DAILY
COMMUNITY

## 2024-01-10 RX ORDER — EPINEPHRINE 0.22MG
100 AEROSOL WITH ADAPTER (ML) INHALATION DAILY
COMMUNITY

## 2024-01-10 ASSESSMENT — CHADS2 SCORE
PRIOR STROKE OR TIA OR THROMBOEMBOLISM: NO
CHADS2 SCORE: 0
DIABETES: NO
HYPERTENSION: NO
CHF: NO
AGE GREATER THAN OR EQUAL TO 75: NO

## 2024-01-10 ASSESSMENT — LIFESTYLE VARIABLES: SMOKING_STATUS: NONSMOKER

## 2024-01-10 ASSESSMENT — DUKE ACTIVITY SCORE INDEX (DASI)
DASI METS SCORE: 6.1
CAN YOU PARTICIPATE IN STRENOUS SPORTS LIKE SWIMMING, SINGLES TENNIS, FOOTBALL, BASKETBALL, OR SKIING: NO
CAN YOU PARTICIPATE IN MODERATE RECREATIONAL ACTIVITIES LIKE GOLF, BOWLING, DANCING, DOUBLES TENNIS OR THROWING A BASEBALL OR FOOTBALL: NO
CAN YOU TAKE CARE OF YOURSELF (EAT, DRESS, BATHE, OR USE TOILET): YES
CAN YOU DO YARD WORK LIKE RAKING LEAVES, WEEDING OR PUSHING A MOWER: NO
CAN YOU WALK INDOORS, SUCH AS AROUND YOUR HOUSE: YES
CAN YOU HAVE SEXUAL RELATIONS: NO
CAN YOU CLIMB A FLIGHT OF STAIRS OR WALK UP A HILL: YES
CAN YOU RUN A SHORT DISTANCE: NO
CAN YOU DO LIGHT WORK AROUND THE HOUSE LIKE DUSTING OR WASHING DISHES: YES
CAN YOU DO MODERATE WORK AROUND THE HOUSE LIKE VACUUMING, SWEEPING FLOORS OR CARRYING GROCERIES: YES
CAN YOU WALK A BLOCK OR TWO ON LEVEL GROUND: YES
CAN YOU DO HEAVY WORK AROUND THE HOUSE LIKE SCRUBBING FLOORS OR LIFTING AND MOVING HEAVY FURNITURE: YES
TOTAL_SCORE: 26.95

## 2024-01-10 ASSESSMENT — PAIN - FUNCTIONAL ASSESSMENT: PAIN_FUNCTIONAL_ASSESSMENT: 0-10

## 2024-01-10 ASSESSMENT — ACTIVITIES OF DAILY LIVING (ADL): ADL_SCORE: 1

## 2024-01-10 NOTE — CPM/PAT H&P
CPM/PAT Evaluation       Name: Violeta Sterling (Violeta Sterling)  /Age: 1950/73 y.o.     In-Person       Chief Complaint: right knee pains    HPI    CG is a 74 yo who recently underwent a left TKR in 2023 and did well- now scheduled for right TKR due to ongoing pains in this pain. Imaging shows right knee OA. No recent steroids. Skin intact. Denies any recent falls. Otherwise denies any recent illness, fever/chills, chest pains or shortness of breath.     Past Medical History:   Diagnosis Date    Arthritis     DVT (deep venous thrombosis) (CMS/[facility])     provoked after a surgery in      PCP: Dr. Oneill    Past Surgical History:   Procedure Laterality Date    KNEE ARTHROPLASTY      KNEE SURGERY         Patient  has no history on file for sexual activity.    Family History   Problem Relation Name Age of Onset    Hypertension Mother      Heart disease Father      Diabetes Sister      Hypertension Brother         No Known Allergies    Prior to Admission medications    Not on File        PAT ROS   Constitutional: Negative for fever, chills, or sweats   ENMT: Negative for nasal discharge, congestion, ear pain, mouth pain, throat pain   Respiratory: Negative for cough, wheezing, shortness of breath   Cardiac: Negative for chest pain, dyspnea on exertion, palpitations   Gastrointestinal: Negative for nausea, vomiting, diarrhea, constipation, abdominal pain  Genitourinary: Negative for dysuria, flank pain, frequency, hematuria     Musculoskeletal: Positive for decreased ROM, pain, swelling, weakness in right knee     Neurological: Negative for dizziness, confusion, headache  Psychiatric: Negative for mood changes   Skin: Negative for itching, rash, ulcer    Hematologic/Lymph: Negative for bruising, easy bleeding  Allergic/Immunologic: Negative itching, sneezing, swelling      Physical Exam  HENT:      Head: Normocephalic.      Mouth/Throat:      Mouth: Mucous membranes are moist.   Eyes:      Extraocular  Movements: Extraocular movements intact.   Cardiovascular:      Rate and Rhythm: Normal rate and regular rhythm.   Pulmonary:      Effort: Pulmonary effort is normal.      Breath sounds: Normal breath sounds.   Abdominal:      General: Abdomen is flat.      Palpations: Abdomen is soft.   Musculoskeletal:      Cervical back: Normal range of motion.      Comments: Right knee limited ROM   Skin:     General: Skin is warm and dry.   Neurological:      General: No focal deficit present.      Mental Status: She is alert.   Psychiatric:         Mood and Affect: Mood normal.          PAT AIRWAY:   Airway:     Neck ROM::  Full  normal      Anesthesia:  Patient denies any anesthesia complications.     Visit Vitals  /71   Pulse 78   Temp 36.6 °C (97.9 °F) (Temporal)   Resp 18       DASI Risk Score      Flowsheet Row Most Recent Value   DASI SCORE 26.95   METS Score (Will be calculated only when all the questions are answered) 6.1          Caprini DVT Assessment      Flowsheet Row Most Recent Value   DVT Score 14   Current Status Major surgery planned, lasting 2-3 hours, Elective major lower extremity arthroplasty   History Prior major surgery   Age 60-75 years   BMI 41-50 (Morbid obesity)          Modified Frailty Index      Flowsheet Row Most Recent Value   Modified Frailty Index Calculator 0          CHADS2 Stroke Risk         N/A 3 - 100%: High Risk   2 - 3%: Medium Risk   0 - 2%: Low Risk     Last Change: N/A          This score determines the patient's risk of having a stroke if the patient has atrial fibrillation.        This score is not applicable to this patient. Components are not calculated.          Revised Cardiac Risk Index    No data to display       Apfel Simplified Score    No data to display       Risk Analysis Index Results This Encounter         1/10/2024  1025             JUAN Cancer History: Patient does not indicate history of cancer    Total Risk Analysis Index Score Without Cancer: 23    Total  Risk Analysis Index Score: 23          Stop Bang Score      Flowsheet Row Most Recent Value   Do you snore loudly? 0   Do you often feel tired or fatigued after your sleep? 0   Has anyone ever observed you stop breathing in your sleep? 0   Do you have or are you being treated for high blood pressure? 0   Recent BMI (Calculated) 46   Is BMI greater than 35 kg/m2? 1=Yes   Age older than 50 years old? 1=Yes   Is your neck circumference greater than 17 inches (Male) or 16 inches (Female)? 1   Gender - Male 0=No   STOP-BANG Total Score 3            Assessment and Plan:     74 yo female scheduled for right total knee arthroplasty on 1/24/2024 with Dr. Harper. Blood work and MRSA ordered- oral rinse prescribed. EKG shows NSR with v rate of 64 bpm. She is instructed to notify surgeon office if any new skin changes occur to surgical limb. Otherwise no further orders indicated.     See risk scores as previously documented.

## 2024-01-10 NOTE — H&P (VIEW-ONLY)
CPM/PAT Evaluation       Name: Violeta Sterling (Violeta Sterling)  /Age: 1950/73 y.o.     In-Person       Chief Complaint: right knee pains    HPI    CG is a 74 yo who recently underwent a left TKR in 2023 and did well- now scheduled for right TKR due to ongoing pains in this pain. Imaging shows right knee OA. No recent steroids. Skin intact. Denies any recent falls. Otherwise denies any recent illness, fever/chills, chest pains or shortness of breath.     Past Medical History:   Diagnosis Date    Arthritis     DVT (deep venous thrombosis) (CMS/[facility])     provoked after a surgery in      PCP: Dr. Oneill    Past Surgical History:   Procedure Laterality Date    KNEE ARTHROPLASTY      KNEE SURGERY         Patient  has no history on file for sexual activity.    Family History   Problem Relation Name Age of Onset    Hypertension Mother      Heart disease Father      Diabetes Sister      Hypertension Brother         No Known Allergies    Prior to Admission medications    Not on File        PAT ROS   Constitutional: Negative for fever, chills, or sweats   ENMT: Negative for nasal discharge, congestion, ear pain, mouth pain, throat pain   Respiratory: Negative for cough, wheezing, shortness of breath   Cardiac: Negative for chest pain, dyspnea on exertion, palpitations   Gastrointestinal: Negative for nausea, vomiting, diarrhea, constipation, abdominal pain  Genitourinary: Negative for dysuria, flank pain, frequency, hematuria     Musculoskeletal: Positive for decreased ROM, pain, swelling, weakness in right knee     Neurological: Negative for dizziness, confusion, headache  Psychiatric: Negative for mood changes   Skin: Negative for itching, rash, ulcer    Hematologic/Lymph: Negative for bruising, easy bleeding  Allergic/Immunologic: Negative itching, sneezing, swelling      Physical Exam  HENT:      Head: Normocephalic.      Mouth/Throat:      Mouth: Mucous membranes are moist.   Eyes:      Extraocular  Movements: Extraocular movements intact.   Cardiovascular:      Rate and Rhythm: Normal rate and regular rhythm.   Pulmonary:      Effort: Pulmonary effort is normal.      Breath sounds: Normal breath sounds.   Abdominal:      General: Abdomen is flat.      Palpations: Abdomen is soft.   Musculoskeletal:      Cervical back: Normal range of motion.      Comments: Right knee limited ROM   Skin:     General: Skin is warm and dry.   Neurological:      General: No focal deficit present.      Mental Status: She is alert.   Psychiatric:         Mood and Affect: Mood normal.          PAT AIRWAY:   Airway:     Neck ROM::  Full  normal      Anesthesia:  Patient denies any anesthesia complications.     Visit Vitals  /71   Pulse 78   Temp 36.6 °C (97.9 °F) (Temporal)   Resp 18       DASI Risk Score      Flowsheet Row Most Recent Value   DASI SCORE 26.95   METS Score (Will be calculated only when all the questions are answered) 6.1          Caprini DVT Assessment      Flowsheet Row Most Recent Value   DVT Score 14   Current Status Major surgery planned, lasting 2-3 hours, Elective major lower extremity arthroplasty   History Prior major surgery   Age 60-75 years   BMI 41-50 (Morbid obesity)          Modified Frailty Index      Flowsheet Row Most Recent Value   Modified Frailty Index Calculator 0          CHADS2 Stroke Risk         N/A 3 - 100%: High Risk   2 - 3%: Medium Risk   0 - 2%: Low Risk     Last Change: N/A          This score determines the patient's risk of having a stroke if the patient has atrial fibrillation.        This score is not applicable to this patient. Components are not calculated.          Revised Cardiac Risk Index    No data to display       Apfel Simplified Score    No data to display       Risk Analysis Index Results This Encounter         1/10/2024  1025             JUAN Cancer History: Patient does not indicate history of cancer    Total Risk Analysis Index Score Without Cancer: 23    Total  Risk Analysis Index Score: 23          Stop Bang Score      Flowsheet Row Most Recent Value   Do you snore loudly? 0   Do you often feel tired or fatigued after your sleep? 0   Has anyone ever observed you stop breathing in your sleep? 0   Do you have or are you being treated for high blood pressure? 0   Recent BMI (Calculated) 46   Is BMI greater than 35 kg/m2? 1=Yes   Age older than 50 years old? 1=Yes   Is your neck circumference greater than 17 inches (Male) or 16 inches (Female)? 1   Gender - Male 0=No   STOP-BANG Total Score 3            Assessment and Plan:     74 yo female scheduled for right total knee arthroplasty on 1/24/2024 with Dr. Harper. Blood work and MRSA ordered- oral rinse prescribed. EKG shows NSR with v rate of 64 bpm. She is instructed to notify surgeon office if any new skin changes occur to surgical limb. Otherwise no further orders indicated.     See risk scores as previously documented.

## 2024-01-10 NOTE — PREPROCEDURE INSTRUCTIONS
Medication List            Accurate as of January 10, 2024 10:48 AM. Always use your most recent med list.                chlorhexidine 0.12 % solution  Commonly known as: Peridex  Swish and spit 15 ml for 2 doses, 15mL the night before surgery and 15 ml morning of surgery - swish for 30 seconds -DO NOT SWALLOW, SPIT OUT  Notes to patient: As indicated     Co Q-10 100 mg capsule  Generic drug: coenzyme Q-10  Medication Adjustments for Surgery: Stop 7 days before surgery     cyanocobalamin 1,000 mcg tablet  Commonly known as: Vitamin B-12  Medication Adjustments for Surgery: Stop 7 days before surgery     GLUCOSAMINE MSM ORAL  Medication Adjustments for Surgery: Stop 7 days before surgery                        PRE-OPERATIVE INSTRUCTIONS    You will receive notification one business day prior to your surgery to confirm your arrival time and additional information. It is important that you answer your phone and/or check your messages during this time.    Please enter the building through the Outpatient entrance. Take the elevator off the lobby to the 2nd floor and check in at the Outpatient Surgery desk    INSTRUCTIONS:  Talk to your surgeon for instructions if you should stop your aspirin, blood thinner, or diabetes medicines.  DO NOT take any multivitamins or over the counter supplements for 7-10 days before surgery.  If not being admitted, you must have an adult immediately available to drive you home after surgery. We also highly recommend you have someone stay with you for the entire day and night of your surgery.  For children having surgery, a parent or legal guardian must accompany t hem to the surgery center. If this is not possible, please call [phone] to make additional arrangements.  For adults who are unable to consent or make medical decisions for themselves, a legal guardian or Power of  must accompany them to the surgery center. If this is not possible, please call [phone] to make  additional arrangements.  Wear comfortable, loose fitting clothing.  All jewelry and piercings must be removed. If you are unable to remove an item or have a dermal piercing, please be sure to tell the nurse when you arrive for surgery.  Nail polish and make-up must be removed.  Avoid smoking or consuming alcohol for 24 hours before surgery.  To help prevent infection, please take a shower/bath and wash your hair the night before and/or morning of surgery.    Additional instructions about eating and drinking before surgery:  Do not eat any solid foods after midnight. Milk, nutritional drinks/supplements, and infant formula are considered solid foods.  You may drink up to 12 oz. of clear liquids up to 2 hours before your arrival time for surgery, unless directed otherwise by your surgeon. Clear liquids include water, non-carbonated sports drinks (Gatorade), black tea or coffee (no creamers) and breast milk.    If you received a blue folder, please review additional information provided inside the folder regarding additional preparation.     If you have any questions or concerns, please call Pre-Admission Testing at [phone].

## 2024-01-12 LAB — STAPHYLOCOCCUS SPEC CULT: NORMAL

## 2024-01-18 ENCOUNTER — TELEPHONE (OUTPATIENT)
Dept: INPATIENT UNIT | Facility: HOSPITAL | Age: 74
End: 2024-01-18
Payer: MEDICARE

## 2024-01-18 ASSESSMENT — KOOS JR
RISING FROM SITTING: MODERATE
HOW SEVERE IS YOUR KNEE STIFFNESS AFTER FIRST WAKING IN MORNING: MODERATE
GOING UP OR DOWN STAIRS: EXTREME
KOOS JR SCORING: 39.63
STANDING UPRIGHT: MODERATE
TWISING OR PIVOTING ON KNEE: SEVERE
STRAIGHTENING KNEE FULLY: MODERATE
BENDING TO THE FLOOR TO PICK UP OBJECT: EXTREME

## 2024-01-24 ENCOUNTER — HOSPITAL ENCOUNTER (OUTPATIENT)
Facility: HOSPITAL | Age: 74
Discharge: HOME | End: 2024-01-25
Attending: ORTHOPAEDIC SURGERY | Admitting: ORTHOPAEDIC SURGERY
Payer: MEDICARE

## 2024-01-24 ENCOUNTER — ANESTHESIA (OUTPATIENT)
Dept: OPERATING ROOM | Facility: HOSPITAL | Age: 74
End: 2024-01-24
Payer: MEDICARE

## 2024-01-24 ENCOUNTER — APPOINTMENT (OUTPATIENT)
Dept: RADIOLOGY | Facility: HOSPITAL | Age: 74
End: 2024-01-24
Payer: MEDICARE

## 2024-01-24 ENCOUNTER — ANESTHESIA EVENT (OUTPATIENT)
Dept: OPERATING ROOM | Facility: HOSPITAL | Age: 74
End: 2024-01-24
Payer: MEDICARE

## 2024-01-24 DIAGNOSIS — M17.11 OSTEOARTHRITIS OF RIGHT KNEE, UNSPECIFIED OSTEOARTHRITIS TYPE: Primary | ICD-10-CM

## 2024-01-24 LAB
ABO GROUP (TYPE) IN BLOOD: NORMAL
ANTIBODY SCREEN: NORMAL
RH FACTOR (ANTIGEN D): NORMAL

## 2024-01-24 PROCEDURE — 2780000003 HC OR 278 NO HCPCS: Performed by: ORTHOPAEDIC SURGERY

## 2024-01-24 PROCEDURE — A6213 FOAM DRG >16<=48 SQ IN W/BDR: HCPCS | Performed by: ORTHOPAEDIC SURGERY

## 2024-01-24 PROCEDURE — C1776 JOINT DEVICE (IMPLANTABLE): HCPCS | Performed by: ORTHOPAEDIC SURGERY

## 2024-01-24 PROCEDURE — 2500000004 HC RX 250 GENERAL PHARMACY W/ HCPCS (ALT 636 FOR OP/ED): Performed by: ORTHOPAEDIC SURGERY

## 2024-01-24 PROCEDURE — 2500000004 HC RX 250 GENERAL PHARMACY W/ HCPCS (ALT 636 FOR OP/ED): Performed by: ANESTHESIOLOGIST ASSISTANT

## 2024-01-24 PROCEDURE — 3700000001 HC GENERAL ANESTHESIA TIME - INITIAL BASE CHARGE: Performed by: ORTHOPAEDIC SURGERY

## 2024-01-24 PROCEDURE — A27447 PR TOTAL KNEE ARTHROPLASTY: Performed by: ANESTHESIOLOGIST ASSISTANT

## 2024-01-24 PROCEDURE — 2500000001 HC RX 250 WO HCPCS SELF ADMINISTERED DRUGS (ALT 637 FOR MEDICARE OP): Performed by: ORTHOPAEDIC SURGERY

## 2024-01-24 PROCEDURE — 73560 X-RAY EXAM OF KNEE 1 OR 2: CPT | Mod: RT

## 2024-01-24 PROCEDURE — 2500000005 HC RX 250 GENERAL PHARMACY W/O HCPCS: Performed by: ORTHOPAEDIC SURGERY

## 2024-01-24 PROCEDURE — 64447 NJX AA&/STRD FEMORAL NRV IMG: CPT | Performed by: ANESTHESIOLOGY

## 2024-01-24 PROCEDURE — 7100000011 HC EXTENDED STAY RECOVERY HOURLY - NURSING UNIT

## 2024-01-24 PROCEDURE — 99100 ANES PT EXTEME AGE<1 YR&>70: CPT | Performed by: ANESTHESIOLOGY

## 2024-01-24 PROCEDURE — 86901 BLOOD TYPING SEROLOGIC RH(D): CPT | Performed by: ORTHOPAEDIC SURGERY

## 2024-01-24 PROCEDURE — 2500000004 HC RX 250 GENERAL PHARMACY W/ HCPCS (ALT 636 FOR OP/ED): Performed by: ANESTHESIOLOGY

## 2024-01-24 PROCEDURE — 97161 PT EVAL LOW COMPLEX 20 MIN: CPT | Mod: GP

## 2024-01-24 PROCEDURE — 2720000007 HC OR 272 NO HCPCS: Performed by: ORTHOPAEDIC SURGERY

## 2024-01-24 PROCEDURE — 2500000005 HC RX 250 GENERAL PHARMACY W/O HCPCS: Performed by: ANESTHESIOLOGY

## 2024-01-24 PROCEDURE — A9999 DME SUPPLY OR ACCESSORY, NOS: HCPCS | Performed by: ORTHOPAEDIC SURGERY

## 2024-01-24 PROCEDURE — 3600000017 HC OR TIME - EACH INCREMENTAL 1 MINUTE - PROCEDURE LEVEL SIX: Performed by: ORTHOPAEDIC SURGERY

## 2024-01-24 PROCEDURE — 3600000018 HC OR TIME - INITIAL BASE CHARGE - PROCEDURE LEVEL SIX: Performed by: ORTHOPAEDIC SURGERY

## 2024-01-24 PROCEDURE — 2500000001 HC RX 250 WO HCPCS SELF ADMINISTERED DRUGS (ALT 637 FOR MEDICARE OP): Performed by: PHYSICIAN ASSISTANT

## 2024-01-24 PROCEDURE — 7100000002 HC RECOVERY ROOM TIME - EACH INCREMENTAL 1 MINUTE: Performed by: ORTHOPAEDIC SURGERY

## 2024-01-24 PROCEDURE — 2700000047 HC OR 270 NO HCPCS: Performed by: ORTHOPAEDIC SURGERY

## 2024-01-24 PROCEDURE — A27447 PR TOTAL KNEE ARTHROPLASTY: Performed by: ANESTHESIOLOGY

## 2024-01-24 PROCEDURE — 36415 COLL VENOUS BLD VENIPUNCTURE: CPT | Performed by: ORTHOPAEDIC SURGERY

## 2024-01-24 PROCEDURE — 7100000001 HC RECOVERY ROOM TIME - INITIAL BASE CHARGE: Performed by: ORTHOPAEDIC SURGERY

## 2024-01-24 PROCEDURE — 73560 X-RAY EXAM OF KNEE 1 OR 2: CPT | Mod: RIGHT SIDE | Performed by: RADIOLOGY

## 2024-01-24 PROCEDURE — 97116 GAIT TRAINING THERAPY: CPT | Mod: GP

## 2024-01-24 PROCEDURE — 2500000005 HC RX 250 GENERAL PHARMACY W/O HCPCS: Performed by: ANESTHESIOLOGIST ASSISTANT

## 2024-01-24 PROCEDURE — 3700000002 HC GENERAL ANESTHESIA TIME - EACH INCREMENTAL 1 MINUTE: Performed by: ORTHOPAEDIC SURGERY

## 2024-01-24 DEVICE — TIBIAL BEARING INSERT - CS
Type: IMPLANTABLE DEVICE | Site: KNEE | Status: FUNCTIONAL
Brand: TRIATHLON

## 2024-01-24 DEVICE — SIMPLEX® HV IS A FAST-SETTING ACRYLIC RESIN FOR USE IN BONE SURGERY. MIXING THE TWO SEPARATE STERILE COMPONENTS PRODUCES A DUCTILE BONE CEMENT WHICH, AFTER HARDENING, FIXES THE IMPLANT AND TRANSFERS STRESSES PRODUCED DURING MOVEMENT EVENLY TO THE BONE. SIMPLEX® HV CEMENT POWDER ALSO CONTAINS INSOLUBLE ZIRCONIUM DIOXIDE AS AN X-RAY CONTRAST MEDIUM. SIMPLEX® HV DOES NOT EMIT A SIGNAL AND DOES NOT POSE A SAFETY RISK IN A MAGNETIC RESONANCE ENVIRONMENT.
Type: IMPLANTABLE DEVICE | Site: KNEE | Status: FUNCTIONAL
Brand: SIMPLEX HV

## 2024-01-24 DEVICE — ASYMMETRIC PATELLA
Type: IMPLANTABLE DEVICE | Site: KNEE | Status: FUNCTIONAL
Brand: TRIATHLON

## 2024-01-24 DEVICE — UNIVERSAL TIBIAL BASEPLATE
Type: IMPLANTABLE DEVICE | Site: KNEE | Status: FUNCTIONAL
Brand: TRIATHLON

## 2024-01-24 DEVICE — CRUCIATE RETAINING FEMORAL
Type: IMPLANTABLE DEVICE | Site: KNEE | Status: FUNCTIONAL
Brand: TRIATHLON

## 2024-01-24 RX ORDER — OXYCODONE AND ACETAMINOPHEN 5; 325 MG/1; MG/1
1 TABLET ORAL EVERY 4 HOURS PRN
Status: DISCONTINUED | OUTPATIENT
Start: 2024-01-24 | End: 2024-01-24

## 2024-01-24 RX ORDER — ACETAMINOPHEN 325 MG/1
650 TABLET ORAL EVERY 6 HOURS SCHEDULED
Status: DISCONTINUED | OUTPATIENT
Start: 2024-01-24 | End: 2024-01-25 | Stop reason: HOSPADM

## 2024-01-24 RX ORDER — SODIUM CHLORIDE, SODIUM LACTATE, POTASSIUM CHLORIDE, CALCIUM CHLORIDE 600; 310; 30; 20 MG/100ML; MG/100ML; MG/100ML; MG/100ML
50 INJECTION, SOLUTION INTRAVENOUS CONTINUOUS
Status: ACTIVE | OUTPATIENT
Start: 2024-01-24 | End: 2024-01-25

## 2024-01-24 RX ORDER — MORPHINE SULFATE 2 MG/ML
2 INJECTION, SOLUTION INTRAMUSCULAR; INTRAVENOUS EVERY 2 HOUR PRN
Status: DISCONTINUED | OUTPATIENT
Start: 2024-01-24 | End: 2024-01-25 | Stop reason: HOSPADM

## 2024-01-24 RX ORDER — TRANEXAMIC ACID 650 MG/1
1950 TABLET ORAL ONCE
Status: COMPLETED | OUTPATIENT
Start: 2024-01-24 | End: 2024-01-24

## 2024-01-24 RX ORDER — HYDRALAZINE HYDROCHLORIDE 20 MG/ML
5 INJECTION INTRAMUSCULAR; INTRAVENOUS EVERY 30 MIN PRN
Status: DISCONTINUED | OUTPATIENT
Start: 2024-01-24 | End: 2024-01-24 | Stop reason: HOSPADM

## 2024-01-24 RX ORDER — DIPHENHYDRAMINE HCL 12.5MG/5ML
12.5 LIQUID (ML) ORAL EVERY 6 HOURS PRN
Status: DISCONTINUED | OUTPATIENT
Start: 2024-01-24 | End: 2024-01-25 | Stop reason: HOSPADM

## 2024-01-24 RX ORDER — ONDANSETRON HYDROCHLORIDE 2 MG/ML
4 INJECTION, SOLUTION INTRAVENOUS ONCE AS NEEDED
Status: DISCONTINUED | OUTPATIENT
Start: 2024-01-24 | End: 2024-01-24 | Stop reason: HOSPADM

## 2024-01-24 RX ORDER — ACETAMINOPHEN 325 MG/1
650 TABLET ORAL EVERY 4 HOURS PRN
Status: DISCONTINUED | OUTPATIENT
Start: 2024-01-24 | End: 2024-01-24 | Stop reason: HOSPADM

## 2024-01-24 RX ORDER — BISACODYL 5 MG
10 TABLET, DELAYED RELEASE (ENTERIC COATED) ORAL DAILY PRN
Status: DISCONTINUED | OUTPATIENT
Start: 2024-01-24 | End: 2024-01-25 | Stop reason: HOSPADM

## 2024-01-24 RX ORDER — PHENYLEPHRINE HCL IN 0.9% NACL 1 MG/10 ML
SYRINGE (ML) INTRAVENOUS AS NEEDED
Status: DISCONTINUED | OUTPATIENT
Start: 2024-01-24 | End: 2024-01-24

## 2024-01-24 RX ORDER — HYDROMORPHONE HYDROCHLORIDE 1 MG/ML
0.5 INJECTION, SOLUTION INTRAMUSCULAR; INTRAVENOUS; SUBCUTANEOUS EVERY 5 MIN PRN
Status: DISCONTINUED | OUTPATIENT
Start: 2024-01-24 | End: 2024-01-24 | Stop reason: HOSPADM

## 2024-01-24 RX ORDER — ASPIRIN 81 MG/1
81 TABLET ORAL 2 TIMES DAILY
Status: DISCONTINUED | OUTPATIENT
Start: 2024-01-24 | End: 2024-01-25 | Stop reason: HOSPADM

## 2024-01-24 RX ORDER — DOCUSATE SODIUM 100 MG/1
100 CAPSULE, LIQUID FILLED ORAL 2 TIMES DAILY
Status: DISCONTINUED | OUTPATIENT
Start: 2024-01-24 | End: 2024-01-25 | Stop reason: HOSPADM

## 2024-01-24 RX ORDER — OXYCODONE AND ACETAMINOPHEN 5; 325 MG/1; MG/1
0.5 TABLET ORAL EVERY 4 HOURS PRN
Status: DISCONTINUED | OUTPATIENT
Start: 2024-01-24 | End: 2024-01-24

## 2024-01-24 RX ORDER — ONDANSETRON HYDROCHLORIDE 2 MG/ML
INJECTION, SOLUTION INTRAVENOUS AS NEEDED
Status: DISCONTINUED | OUTPATIENT
Start: 2024-01-24 | End: 2024-01-24

## 2024-01-24 RX ORDER — SODIUM CHLORIDE, SODIUM LACTATE, POTASSIUM CHLORIDE, CALCIUM CHLORIDE 600; 310; 30; 20 MG/100ML; MG/100ML; MG/100ML; MG/100ML
100 INJECTION, SOLUTION INTRAVENOUS CONTINUOUS
Status: DISCONTINUED | OUTPATIENT
Start: 2024-01-24 | End: 2024-01-24

## 2024-01-24 RX ORDER — DEXAMETHASONE SODIUM PHOSPHATE 100 MG/10ML
INJECTION INTRAMUSCULAR; INTRAVENOUS AS NEEDED
Status: DISCONTINUED | OUTPATIENT
Start: 2024-01-24 | End: 2024-01-24

## 2024-01-24 RX ORDER — ALBUTEROL SULFATE 0.83 MG/ML
2.5 SOLUTION RESPIRATORY (INHALATION) ONCE AS NEEDED
Status: DISCONTINUED | OUTPATIENT
Start: 2024-01-24 | End: 2024-01-24 | Stop reason: HOSPADM

## 2024-01-24 RX ORDER — PROPOFOL 10 MG/ML
INJECTION, EMULSION INTRAVENOUS CONTINUOUS PRN
Status: DISCONTINUED | OUTPATIENT
Start: 2024-01-24 | End: 2024-01-24

## 2024-01-24 RX ORDER — OXYCODONE HYDROCHLORIDE 10 MG/1
10 TABLET ORAL EVERY 4 HOURS PRN
Status: DISCONTINUED | OUTPATIENT
Start: 2024-01-24 | End: 2024-01-24 | Stop reason: HOSPADM

## 2024-01-24 RX ORDER — SYRING-NEEDL,DISP,INSUL,0.3 ML 29 G X1/2"
297 SYRINGE, EMPTY DISPOSABLE MISCELLANEOUS ONCE AS NEEDED
Status: DISCONTINUED | OUTPATIENT
Start: 2024-01-24 | End: 2024-01-25 | Stop reason: HOSPADM

## 2024-01-24 RX ORDER — CHLORHEXIDINE GLUCONATE ORAL RINSE 1.2 MG/ML
15 SOLUTION DENTAL 2 TIMES DAILY
Status: DISCONTINUED | OUTPATIENT
Start: 2024-01-24 | End: 2024-01-25 | Stop reason: HOSPADM

## 2024-01-24 RX ORDER — ACETAMINOPHEN 325 MG/1
975 TABLET ORAL ONCE
Status: DISCONTINUED | OUTPATIENT
Start: 2024-01-24 | End: 2024-01-24 | Stop reason: HOSPADM

## 2024-01-24 RX ORDER — MIDAZOLAM HYDROCHLORIDE 1 MG/ML
INJECTION, SOLUTION INTRAMUSCULAR; INTRAVENOUS AS NEEDED
Status: DISCONTINUED | OUTPATIENT
Start: 2024-01-24 | End: 2024-01-24

## 2024-01-24 RX ORDER — MIDAZOLAM HYDROCHLORIDE 1 MG/ML
1 INJECTION, SOLUTION INTRAMUSCULAR; INTRAVENOUS ONCE AS NEEDED
Status: DISCONTINUED | OUTPATIENT
Start: 2024-01-24 | End: 2024-01-24 | Stop reason: HOSPADM

## 2024-01-24 RX ORDER — OXYCODONE HYDROCHLORIDE 10 MG/1
10 TABLET ORAL EVERY 4 HOURS PRN
Status: DISCONTINUED | OUTPATIENT
Start: 2024-01-24 | End: 2024-01-25 | Stop reason: HOSPADM

## 2024-01-24 RX ORDER — HYDROMORPHONE HYDROCHLORIDE 1 MG/ML
0.1 INJECTION, SOLUTION INTRAMUSCULAR; INTRAVENOUS; SUBCUTANEOUS EVERY 5 MIN PRN
Status: DISCONTINUED | OUTPATIENT
Start: 2024-01-24 | End: 2024-01-24 | Stop reason: HOSPADM

## 2024-01-24 RX ORDER — OXYCODONE HYDROCHLORIDE 5 MG/1
5 TABLET ORAL EVERY 4 HOURS PRN
Status: DISCONTINUED | OUTPATIENT
Start: 2024-01-24 | End: 2024-01-25 | Stop reason: HOSPADM

## 2024-01-24 RX ORDER — SODIUM CHLORIDE, SODIUM LACTATE, POTASSIUM CHLORIDE, CALCIUM CHLORIDE 600; 310; 30; 20 MG/100ML; MG/100ML; MG/100ML; MG/100ML
100 INJECTION, SOLUTION INTRAVENOUS CONTINUOUS
Status: DISCONTINUED | OUTPATIENT
Start: 2024-01-24 | End: 2024-01-24 | Stop reason: HOSPADM

## 2024-01-24 RX ORDER — HYDROCODONE BITARTRATE AND ACETAMINOPHEN 5; 325 MG/1; MG/1
1 TABLET ORAL EVERY 4 HOURS PRN
Status: DISCONTINUED | OUTPATIENT
Start: 2024-01-24 | End: 2024-01-24 | Stop reason: HOSPADM

## 2024-01-24 RX ORDER — DEXAMETHASONE SODIUM PHOSPHATE 10 MG/ML
INJECTION INTRAMUSCULAR; INTRAVENOUS AS NEEDED
Status: DISCONTINUED | OUTPATIENT
Start: 2024-01-24 | End: 2024-01-24

## 2024-01-24 RX ORDER — HYDROMORPHONE HYDROCHLORIDE 1 MG/ML
1 INJECTION, SOLUTION INTRAMUSCULAR; INTRAVENOUS; SUBCUTANEOUS EVERY 5 MIN PRN
Status: DISCONTINUED | OUTPATIENT
Start: 2024-01-24 | End: 2024-01-24 | Stop reason: HOSPADM

## 2024-01-24 RX ORDER — NALOXONE HYDROCHLORIDE 0.4 MG/ML
0.2 INJECTION, SOLUTION INTRAMUSCULAR; INTRAVENOUS; SUBCUTANEOUS EVERY 5 MIN PRN
Status: DISCONTINUED | OUTPATIENT
Start: 2024-01-24 | End: 2024-01-25 | Stop reason: HOSPADM

## 2024-01-24 RX ORDER — CYCLOBENZAPRINE HCL 10 MG
10 TABLET ORAL 3 TIMES DAILY PRN
Status: DISCONTINUED | OUTPATIENT
Start: 2024-01-24 | End: 2024-01-25 | Stop reason: HOSPADM

## 2024-01-24 RX ORDER — ROPIVACAINE/EPI/CLONIDINE/KET 2.46-0.005
SYRINGE (ML) INJECTION AS NEEDED
Status: DISCONTINUED | OUTPATIENT
Start: 2024-01-24 | End: 2024-01-24 | Stop reason: HOSPADM

## 2024-01-24 RX ORDER — BUPIVACAINE HYDROCHLORIDE 7.5 MG/ML
INJECTION, SOLUTION INTRASPINAL AS NEEDED
Status: DISCONTINUED | OUTPATIENT
Start: 2024-01-24 | End: 2024-01-24

## 2024-01-24 RX ORDER — LANOLIN ALCOHOL/MO/W.PET/CERES
1000 CREAM (GRAM) TOPICAL DAILY
Status: DISCONTINUED | OUTPATIENT
Start: 2024-01-24 | End: 2024-01-25 | Stop reason: HOSPADM

## 2024-01-24 RX ADMIN — HYDROMORPHONE HYDROCHLORIDE 0.5 MG: 1 INJECTION, SOLUTION INTRAMUSCULAR; INTRAVENOUS; SUBCUTANEOUS at 11:21

## 2024-01-24 RX ADMIN — DOCUSATE SODIUM 100 MG: 100 CAPSULE, LIQUID FILLED ORAL at 22:41

## 2024-01-24 RX ADMIN — Medication 100 MCG: at 09:22

## 2024-01-24 RX ADMIN — TRANEXAMIC ACID 1950 MG: 650 TABLET ORAL at 06:56

## 2024-01-24 RX ADMIN — OXYCODONE HYDROCHLORIDE 5 MG: 5 TABLET ORAL at 17:34

## 2024-01-24 RX ADMIN — CYANOCOBALAMIN TAB 1000 MCG 1000 MCG: 1000 TAB at 13:17

## 2024-01-24 RX ADMIN — OXYCODONE HYDROCHLORIDE 10 MG: 10 TABLET ORAL at 22:41

## 2024-01-24 RX ADMIN — Medication 100 MCG: at 08:32

## 2024-01-24 RX ADMIN — MIDAZOLAM 1 MG: 1 INJECTION INTRAMUSCULAR; INTRAVENOUS at 08:00

## 2024-01-24 RX ADMIN — DEXTROSE MONOHYDRATE 3 G: 5 INJECTION INTRAVENOUS at 16:58

## 2024-01-24 RX ADMIN — ACETAMINOPHEN 650 MG: 325 TABLET ORAL at 17:34

## 2024-01-24 RX ADMIN — MIDAZOLAM 2 MG: 1 INJECTION INTRAMUSCULAR; INTRAVENOUS at 07:18

## 2024-01-24 RX ADMIN — ASPIRIN 81 MG: 81 TABLET, COATED ORAL at 22:41

## 2024-01-24 RX ADMIN — ONDANSETRON 4 MG: 2 INJECTION INTRAMUSCULAR; INTRAVENOUS at 10:24

## 2024-01-24 RX ADMIN — DEXAMETHASONE SODIUM PHOSPHATE 10 MG: 10 INJECTION INTRAMUSCULAR; INTRAVENOUS at 08:23

## 2024-01-24 RX ADMIN — ACETAMINOPHEN 650 MG: 325 TABLET ORAL at 22:42

## 2024-01-24 RX ADMIN — PROPOFOL 300 MCG/KG/MIN: 10 INJECTION, EMULSION INTRAVENOUS at 08:15

## 2024-01-24 RX ADMIN — BUPIVACAINE HYDROCHLORIDE IN DEXTROSE 1.8 ML: 7.5 INJECTION, SOLUTION SUBARACHNOID at 08:08

## 2024-01-24 RX ADMIN — OXYCODONE HYDROCHLORIDE AND ACETAMINOPHEN 1 TABLET: 5; 325 TABLET ORAL at 13:17

## 2024-01-24 RX ADMIN — Medication 100 MCG: at 09:03

## 2024-01-24 RX ADMIN — SODIUM CHLORIDE, POTASSIUM CHLORIDE, SODIUM LACTATE AND CALCIUM CHLORIDE 50 ML/HR: 600; 310; 30; 20 INJECTION, SOLUTION INTRAVENOUS at 13:17

## 2024-01-24 RX ADMIN — HYDROMORPHONE HYDROCHLORIDE 0.5 MG: 1 INJECTION, SOLUTION INTRAMUSCULAR; INTRAVENOUS; SUBCUTANEOUS at 11:06

## 2024-01-24 RX ADMIN — Medication 3 G: at 08:15

## 2024-01-24 RX ADMIN — Medication 8 L/MIN: at 11:01

## 2024-01-24 RX ADMIN — SODIUM CHLORIDE, POTASSIUM CHLORIDE, SODIUM LACTATE AND CALCIUM CHLORIDE: 600; 310; 30; 20 INJECTION, SOLUTION INTRAVENOUS at 09:40

## 2024-01-24 RX ADMIN — SODIUM CHLORIDE, POTASSIUM CHLORIDE, SODIUM LACTATE AND CALCIUM CHLORIDE 100 ML/HR: 600; 310; 30; 20 INJECTION, SOLUTION INTRAVENOUS at 06:56

## 2024-01-24 SDOH — SOCIAL STABILITY: SOCIAL INSECURITY: ABUSE: ADULT

## 2024-01-24 SDOH — HEALTH STABILITY: MENTAL HEALTH: CURRENT SMOKER: 0

## 2024-01-24 SDOH — SOCIAL STABILITY: SOCIAL INSECURITY: HAVE YOU HAD THOUGHTS OF HARMING ANYONE ELSE?: NO

## 2024-01-24 SDOH — SOCIAL STABILITY: SOCIAL INSECURITY: ARE THERE ANY APPARENT SIGNS OF INJURIES/BEHAVIORS THAT COULD BE RELATED TO ABUSE/NEGLECT?: NO

## 2024-01-24 SDOH — SOCIAL STABILITY: SOCIAL INSECURITY: DO YOU FEEL ANYONE HAS EXPLOITED OR TAKEN ADVANTAGE OF YOU FINANCIALLY OR OF YOUR PERSONAL PROPERTY?: NO

## 2024-01-24 SDOH — SOCIAL STABILITY: SOCIAL INSECURITY: WERE YOU ABLE TO COMPLETE ALL THE BEHAVIORAL HEALTH SCREENINGS?: YES

## 2024-01-24 SDOH — SOCIAL STABILITY: SOCIAL INSECURITY: HAS ANYONE EVER THREATENED TO HURT YOUR FAMILY OR YOUR PETS?: NO

## 2024-01-24 SDOH — SOCIAL STABILITY: SOCIAL INSECURITY: DOES ANYONE TRY TO KEEP YOU FROM HAVING/CONTACTING OTHER FRIENDS OR DOING THINGS OUTSIDE YOUR HOME?: NO

## 2024-01-24 SDOH — SOCIAL STABILITY: SOCIAL INSECURITY: DO YOU FEEL UNSAFE GOING BACK TO THE PLACE WHERE YOU ARE LIVING?: NO

## 2024-01-24 SDOH — SOCIAL STABILITY: SOCIAL INSECURITY: ARE YOU OR HAVE YOU BEEN THREATENED OR ABUSED PHYSICALLY, EMOTIONALLY, OR SEXUALLY BY ANYONE?: NO

## 2024-01-24 ASSESSMENT — COGNITIVE AND FUNCTIONAL STATUS - GENERAL
CLIMB 3 TO 5 STEPS WITH RAILING: A LOT
DRESSING REGULAR UPPER BODY CLOTHING: A LITTLE
DRESSING REGULAR LOWER BODY CLOTHING: A LITTLE
MOBILITY SCORE: 15
HELP NEEDED FOR BATHING: A LITTLE
MOBILITY SCORE: 18
STANDING UP FROM CHAIR USING ARMS: A LOT
TOILETING: A LITTLE
MOVING TO AND FROM BED TO CHAIR: A LITTLE
CLIMB 3 TO 5 STEPS WITH RAILING: A LITTLE
TURNING FROM BACK TO SIDE WHILE IN FLAT BAD: A LITTLE
STANDING UP FROM CHAIR USING ARMS: A LITTLE
PERSONAL GROOMING: A LITTLE
MOVING TO AND FROM BED TO CHAIR: A LITTLE
WALKING IN HOSPITAL ROOM: A LITTLE
MOVING FROM LYING ON BACK TO SITTING ON SIDE OF FLAT BED WITH BEDRAILS: A LITTLE
CLIMB 3 TO 5 STEPS WITH RAILING: A LITTLE
DAILY ACTIVITIY SCORE: 20
HELP NEEDED FOR BATHING: A LITTLE
TURNING FROM BACK TO SIDE WHILE IN FLAT BAD: A LOT
DRESSING REGULAR UPPER BODY CLOTHING: A LITTLE
TURNING FROM BACK TO SIDE WHILE IN FLAT BAD: A LITTLE
MOBILITY SCORE: 18
PERSONAL GROOMING: A LITTLE
WALKING IN HOSPITAL ROOM: A LITTLE
MOVING FROM LYING ON BACK TO SITTING ON SIDE OF FLAT BED WITH BEDRAILS: A LITTLE
MOVING FROM LYING ON BACK TO SITTING ON SIDE OF FLAT BED WITH BEDRAILS: A LITTLE
TOILETING: A LITTLE
MOVING TO AND FROM BED TO CHAIR: A LITTLE
WALKING IN HOSPITAL ROOM: A LITTLE
DAILY ACTIVITIY SCORE: 19
STANDING UP FROM CHAIR USING ARMS: A LITTLE
PATIENT BASELINE BEDBOUND: NO

## 2024-01-24 ASSESSMENT — PAIN SCALES - GENERAL
PAINLEVEL_OUTOF10: 3
PAINLEVEL_OUTOF10: 5 - MODERATE PAIN
PAINLEVEL_OUTOF10: 7
PAINLEVEL_OUTOF10: 0 - NO PAIN
PAINLEVEL_OUTOF10: 7
PAINLEVEL_OUTOF10: 7
PAINLEVEL_OUTOF10: 5 - MODERATE PAIN
PAINLEVEL_OUTOF10: 8
PAINLEVEL_OUTOF10: 7
PAINLEVEL_OUTOF10: 3

## 2024-01-24 ASSESSMENT — PAIN - FUNCTIONAL ASSESSMENT
PAIN_FUNCTIONAL_ASSESSMENT: 0-10
PAIN_FUNCTIONAL_ASSESSMENT: UNABLE TO SELF-REPORT
PAIN_FUNCTIONAL_ASSESSMENT: 0-10

## 2024-01-24 ASSESSMENT — COLUMBIA-SUICIDE SEVERITY RATING SCALE - C-SSRS
2. HAVE YOU ACTUALLY HAD ANY THOUGHTS OF KILLING YOURSELF?: NO
6. HAVE YOU EVER DONE ANYTHING, STARTED TO DO ANYTHING, OR PREPARED TO DO ANYTHING TO END YOUR LIFE?: NO
1. IN THE PAST MONTH, HAVE YOU WISHED YOU WERE DEAD OR WISHED YOU COULD GO TO SLEEP AND NOT WAKE UP?: NO

## 2024-01-24 ASSESSMENT — LIFESTYLE VARIABLES
HOW MANY STANDARD DRINKS CONTAINING ALCOHOL DO YOU HAVE ON A TYPICAL DAY: 1 OR 2
HOW OFTEN DO YOU HAVE A DRINK CONTAINING ALCOHOL: MONTHLY OR LESS
AUDIT-C TOTAL SCORE: 1
SKIP TO QUESTIONS 9-10: 1
HOW OFTEN DO YOU HAVE 6 OR MORE DRINKS ON ONE OCCASION: NEVER
AUDIT-C TOTAL SCORE: 1

## 2024-01-24 ASSESSMENT — ACTIVITIES OF DAILY LIVING (ADL)
BATHING: INDEPENDENT
WALKS IN HOME: INDEPENDENT
TOILETING: INDEPENDENT
JUDGMENT_ADEQUATE_SAFELY_COMPLETE_DAILY_ACTIVITIES: YES
GROOMING: INDEPENDENT
PATIENT'S MEMORY ADEQUATE TO SAFELY COMPLETE DAILY ACTIVITIES?: YES
HEARING - LEFT EAR: FUNCTIONAL
DRESSING YOURSELF: INDEPENDENT
ADL_ASSISTANCE: INDEPENDENT
HEARING - RIGHT EAR: FUNCTIONAL
ADEQUATE_TO_COMPLETE_ADL: YES
LACK_OF_TRANSPORTATION: NO
FEEDING YOURSELF: INDEPENDENT

## 2024-01-24 ASSESSMENT — PAIN DESCRIPTION - ORIENTATION: ORIENTATION: RIGHT

## 2024-01-24 ASSESSMENT — PATIENT HEALTH QUESTIONNAIRE - PHQ9
2. FEELING DOWN, DEPRESSED OR HOPELESS: NOT AT ALL
1. LITTLE INTEREST OR PLEASURE IN DOING THINGS: NOT AT ALL
SUM OF ALL RESPONSES TO PHQ9 QUESTIONS 1 & 2: 0

## 2024-01-24 ASSESSMENT — PAIN DESCRIPTION - LOCATION: LOCATION: KNEE

## 2024-01-24 NOTE — ANESTHESIA PROCEDURE NOTES
Spinal Block    Patient location during procedure: OR  Start time: 1/24/2024 8:02 AM  End time: 1/24/2024 8:08 AM  Reason for block: primary anesthetic and at surgeon's request  Staffing  Performed: LASHAWN and RUCHI   Authorized by: Jonathan Plasencia MD    Performed by: RUCHI Noyola    Preanesthetic Checklist  Completed: patient identified, IV checked, risks and benefits discussed, surgical consent, monitors and equipment checked, pre-op evaluation, timeout performed and sterile techniques followed  Block Timeout  RN/Licensed healthcare professional reads aloud to the Anesthesia provider and entire team: Patient identity, procedure with side and site, patient position, and as applicable the availability of implants/special equipment/special requirements.  Patient on coagulant treatment: no  Timeout performed at: 1/24/2024 8:02 AM  Spinal Block  Patient position: sitting  Prep: ChloraPrep  Sterility prep: cap, drape, gloves, hand hygiene and mask  Sedation level: light sedation  Patient monitoring: blood pressure, continuous pulse oximetry and heart rate  Approach: midline  Vertebral space: L3-4  Injection technique: single-shot  Needle  Needle type: Quincke   Needle gauge: 22 G  Needle length: 5 in  Free flowing CSF: yes    Assessment  Sensory level: T10 bilateral  Block outcome: block to be assessed in the OR  Procedure assessment: patient sedated but conversant throughout procedure

## 2024-01-24 NOTE — ANESTHESIA POSTPROCEDURE EVALUATION
Patient: Violeta Sterling    Procedure Summary       Date: 01/24/24 Room / Location: [facility] OR 09 / Virtual [facility] OR    Anesthesia Start: 0753 Anesthesia Stop:     Procedure: Right total knee arthroplasty (Right: Knee) Diagnosis: (M17.11 - Unilateral primary osteoarthritis, right knee)    Surgeons: Karthik Harper MD Responsible Provider: Jonathan Plasencia MD    Anesthesia Type: general, regional, spinal ASA Status: 3            Anesthesia Type: general, regional, spinal    Vitals Value Taken Time   /74 01/24/24 1101   Temp 36.2 01/24/24 1102   Pulse 64 01/24/24 1101   Resp 13 01/24/24 1101   SpO2 96 % 01/24/24 1101       Anesthesia Post Evaluation    Patient location during evaluation: PACU  Patient participation: complete - patient participated  Level of consciousness: awake and alert, responsive to light touch, responsive to verbal stimuli, responsive to physical stimuli and sleepy but conscious  Pain management: satisfactory to patient  Multimodal analgesia pain management approach  Airway patency: patent  Two or more strategies used to mitigate risk of obstructive sleep apnea  Cardiovascular status: acceptable and hemodynamically stable  Respiratory status: acceptable, face mask, nonlabored ventilation and spontaneous ventilation  Hydration status: euvolemic  Postoperative Nausea and Vomiting: none        No notable events documented.

## 2024-01-24 NOTE — OP NOTE
Right total knee arthroplasty (R) Operative Note     Date: 2024  OR Location: [facility] OR    Name: Violeta Sterling, : 1950, Age: 73 y.o., MRN: [id number], Sex: female    Diagnosis  * No Diagnosis Codes entered * * No Diagnosis Codes entered *     Procedures  Right total knee arthroplasty  43983 - HI ARTHRP KNE CONDYLE&PLATU MEDIAL&LAT COMPARTMENTS      Surgeons      * Karthik Harper - Primary    Resident/Fellow/Other Assistant:  Surgeon(s) and Role:     * Andressa Chavez MD - Assisting    Procedure Summary  Anesthesia: General  ASA: ASA status not filed in the log.  Anesthesia Staff: Anesthesiologist: Jonathan Plasencia MD  C-AA: RUCHI Noyola; RUCHI Conner  MEJIA: Ramesh Wilson  Estimated Blood Loss: 50 mL  Intra-op Medications:   Administrations occurring from 0735 to 1015 on 24:   Medication Name Total Dose   ropivacaine-epinephrine-clonidine-ketorolac 2.46-0.005- 0.0008-0.3mg/mL periarticular syringe 100 mL   lactated Ringer's infusion 58.33 mL   ceFAZolin (Ancef) in dextrose 5% 100 mL IV 3 g 3 g              Anesthesia Record               Intraprocedure I/O Totals          Intake    Propofol Drip 0.00 mL    The total shown is the total volume documented since Anesthesia Start was filed.    lactated Ringer's infusion 1000.00 mL    Total Intake 1000 mL       Output    Est. Blood Loss 50 mL    Total Output 50 mL       Net    Net Volume 950 mL          Specimen: No specimens collected     Staff:   Circulator: Rachele Jefferson RN  Scrub Person: Rachele Nevarez; Roger Nevarez         Drains and/or Catheters: * None in log *    Tourniquet Times:     Total Tourniquet Time Documented:  Thigh (Right) - 75 minutes  Total: Thigh (Right) - 75 minutes      Implants:  Implants       Type Name Action Serial No.      Implant CEMENT, BONE, SIMPLEX HV 40 GM, SINGLE PACK - [id number] Implanted      Implant CEMENT, BONE, SIMPLEX HV 40 GM, SINGLE PACK - [id number] Implanted      Joint  PATELLA, TRIATHLON, ASYMMETRIC X3, SZ-A32 10MM - [id number] Implanted      Joint FEM COMP, TRIATH CR SZ5 RIGHT - [id number] Implanted       14MM TRIATHLON CS TIBIAL INSERT - X3 Implanted      Joint BASEPLATE, TIBIA 5 TS TRIATH - [id number] Implanted               Findings: Advanced osteoarthritis right knee  Morbid obesity, BMI over 45  Severe osteoarthritic deformity right knee    Indications: Violeta Sterling is an 73 y.o. female who is having surgery for M17.11 - Unilateral primary osteoarthritis, right knee.     The patient was seen in the preoperative area. The risks, benefits, complications, treatment options, non-operative alternatives, expected recovery and outcomes were discussed with the patient. The possibilities of reaction to medication, pulmonary aspiration, injury to surrounding structures, bleeding, recurrent infection, the need for additional procedures, failure to diagnose a condition, and creating a complication requiring transfusion or operation were discussed with the patient. The patient concurred with the proposed plan, giving informed consent.  The site of surgery was properly noted/marked if necessary per policy. The patient has been actively warmed in preoperative area. Preoperative antibiotics have been ordered and given within 1 hours of incision. Venous thrombosis prophylaxis have been ordered including bilateral sequential compression devices and chemical prophylaxis    Procedure Details: Right cemented total knee arthroplasty  Components:  CRAVE triathlon size 5 femur CR  Size 5 tibia universal baseplate  14 mm CS insert  32 patella  Complications:  None; patient tolerated the procedure well.    Disposition: PACU - hemodynamically stable.  Condition: stable     73-year of age female who has had increasing pain in her right knee.  She had done well with prior left cemented total knee arthroplasty.  She was noted to have advanced degenerative changes with deformity of the right knee.   She had attempted conservative measures and it failed.  Risks and benefits of nonoperative versus operative treatment were reviewed.  She wanted proceed with right total knee arthroplasty.  Patient is obese with a BMI over 45.  Risks and benefits of surgical management were reviewed extensively.  She was informed the risk to include but not limited to anesthetic risk, neurovascular risk, ligamentous injury, periprosthetic fracture, prosthetic loosening, postoperative complications including but not limited to hematoma, infection, continued pain in the knee, arthrofibrosis, failure of the extensor mechanism, DVT, pulmonary embolus, medical complications, osteolysis and loosening of the implant, need for revision surgery.  All questions were answered and consent was obtained.    Patient was brought to the operating where after induction of spinal anesthesia she was supine on the operating table.  Her right lower extremity was prepped and draped in usual sterile fashion.  She was given 3 g of cefazolin for prophylactic antibiotics.  She was given oral TXA prior to the procedure.  Timeout was taken confirming surgical site, procedure, instrumentation, fire risk, and antibiotics.    Sterile Esmarch was used to exsanguinate the right lower extremity and tourniquet was inflated 300 mmHg.  10 blade was used to make a longitudinal incision.  10 blade was then used to incise the extensor mechanism and for a medial parapatellar approach.  Minimal medial release was performed.  Osteophytes were removed off the tibia and the femur.  There is significant deformity in the tibia and the femur.  Using the [employer] system intramedullary system was placed.  Collateral ligaments were protected.  Distal femur was resected removing 10 mm of bone.  Next using the sizing guide it was noted to be a size 5.  The size five 4-in-1 cutting block was placed in 3 degrees of external rotation.  Cuts were performed with a posterior cut  anterior cut as well as the chamfer cuts.  Size 5 trial femur was placed and it was acceptable fit.  Trial component was then removed.    Tension was turned toward the tibia.  This was subluxated anteriorly.  Retractors were placed.  Extramedullary guide was placed and was placed in appropriate position along the perpendicular longitudinal axis.  Alignment was checked.  Tibial cut was performed.  Trial block and a miranda was utilized and was felt to require 14 mm spacer given the deformity and laxity within the tissues.  Size 5 tibia was appropriate sized.  The baseplate was placed.  After preparation of the tibia and released the posterior osteophytes trial tibia with a 14 mm insert and size 5 femur was trialed and felt to be stable in flexion extension gaps.  Next the patella was everted patella measured 22 mm.  Mccloud cut was performed preserving 12 mm of patella bone.  32 patella was felt to be appropriate size.  The block was placed and drill holes were placed.  Trial patella was placed and then taken through range of motion and there was appropriate tracking.  The tibial was then completed in terms of drilling for the universal baseplate.  Trial components were all removed.  Pulsatile lavage irrigation was performed.  Bony surfaces were all dried.  Sequential cementing was then performed placing the size 5 tibial baseplate 14 mm polyethylene insert size 5 tibia size 5 femur and size 32 patella.  Cement set up.  Excess cement was removed.  Knee was taken through range of motion and stable in flexion extension gaps.  Copious irrigation was then performed with saline irrigation, surgery for irrigation and Irrisept irrigation.  Tourniquet was let down.  Hemostasis was ensured with electrocautery.  The quadricep tendon was repaired with #2 Ethibond sutures.  #2 Vicryl suture was used distally.  Strata fix was utilized.  Dermis and subcutaneous tissue was then closed.  Subcuticular strata fix was placed.  Sterile  dressings were applied.  All sponge and instrument counts were correct at the end of the case.    This case was considered complex due to the following reasons.  1.  Patient's BMI of over 45 required careful retraction of the soft tissues and made exposure difficult.  2.  Deformity of the knee was significant with erosion of the bone.  There is ligamentous instability.  3.  Retraction and positioning the implants required extra care, increased presence of soft tissues as well as the pre-existing deformities.    Patient had mixture of ropivacaine, epinephrine, clonidine, Toradol given into the posterior capsule along the medial lateral collateral ligaments as well as the prepatellar area.        Attending Attestation:     Karthik Harper  Phone Number: [phone]

## 2024-01-24 NOTE — PROGRESS NOTES
Physical Therapy    Physical Therapy Evaluation    Patient Name: Violeta Sterling  MRN: [id number]  Today's Date: 1/24/2024   Time Calculation  Start Time: 1502  Stop Time: 1536  Time Calculation (min): 34 min    Assessment/Plan   PT Assessment  PT Assessment Results: Decreased strength, Decreased range of motion, Decreased endurance, Impaired balance, Decreased mobility, Pain, Orthopedic restrictions  Rehab Prognosis: Good  Evaluation/Treatment Tolerance: Patient tolerated treatment well  Medical Staff Made Aware: Yes  End of Session Communication: Bedside nurse  Assessment Comment: Pt presents today below baseline level of function and requires continued PT during hospital stay. Pt requires PT at a low intensity level at discharge to maximize functional mobility and safety.    End of Session Patient Position: Up in chair, Alarm on  IP OR SWING BED PT PLAN  Inpatient or Swing Bed: Inpatient  PT Plan  Treatment/Interventions: Bed mobility, Transfer training, Gait training, Balance training, Neuromuscular re-education, Strengthening, Endurance training, Range of motion, Therapeutic exercise, Therapeutic activity, Home exercise program, Stair training  PT Plan: Skilled PT  PT Frequency: BID  PT Discharge Recommendations: Low intensity level of continued care  PT Recommended Transfer Status: Assist x2 (FWW, gait belt)  PT - OK to Discharge: Yes (To next level of care when cleared by medical team   )    Subjective         General Visit Information:  General  Reason for Referral: TKA  Referred By: Karthik Harper MD  Past Medical History Relevant to Rehab: Admitted 1/24/24 following completion of right TKA.  Family/Caregiver Present: Yes (brother)  Prior to Session Communication: Bedside nurse  Patient Position Received: Bed, 3 rail up, Alarm on  Preferred Learning Style: verbal  General Comment: Pt agreeable to PT, nursing cleared for treatment.    Home Living:  Home Living  Type of Home: House  Lives With: Alone  Home  Adaptive Equipment: Walker rolling or standard, Cane  Home Layout: Able to live on main level with bedroom/bathroom, Full bath main level  Home Access: Stairs to enter with rails  Entrance Stairs-Number of Steps: 2  Bathroom Shower/Tub: Tub/shower unit  Bathroom Equipment: Grab bars in shower, Shower chair with back  Home Living Comments: brother able to assist at discharge    Prior Level of Function:  Prior Function Per Pt/Caregiver Report  ADL Assistance: Independent  Homemaking Assistance: Independent  Ambulatory Assistance: Independent  Prior Function Comments: denies any falls in the past 6 months    Precautions:  Precautions  LE Weight Bearing Status: Weight Bearing as Tolerated (right LE)  Medical Precautions: Fall precautions  Post-Surgical Precautions: Right total knee precautions    Vital Signs:  Vital Signs  SpO2:  (dropped to 87% on RA in supine, donned 2L and pt recovered to 94%)  Objective     Pain:  Pain Assessment  Pain Assessment: 0-10  Pain Score: 5 - Moderate pain  Pain Type: Surgical pain  Pain Location: Knee  Pain Orientation: Right    Cognition:  Cognition  Overall Cognitive Status: Within Functional Limits  Orientation Level: Oriented X4    General Assessments:      Activity Tolerance  Endurance: Tolerates 10 - 20 min exercise with multiple rests  Sensation  Sensation Comment: reports numbness and tingling BLE              Static Sitting Balance  Static Sitting-Comment/Number of Minutes: good  Dynamic Sitting Balance  Dynamic Sitting-Comments: good  Static Standing Balance  Static Standing-Comment/Number of Minutes: fair  Dynamic Standing Balance  Dynamic Standing-Comments: fair    Functional Assessments:     Bed Mobility  Bed Mobility: Yes  Bed Mobility 1  Bed Mobility 1: Supine to sitting  Level of Assistance 1: Moderate assistance  Bed Mobility Comments 1: x 2  Transfers  Transfer: Yes  Transfer 1  Transfer From 1: Sit to  Transfer to 1: Stand  Transfer Device 1: Walker  Transfer Level of  Assistance 1: Moderate assistance, +2  Transfers 2  Transfer From 2: Stand to  Transfer to 2: Sit  Transfer Device 2: Walker  Transfer Level of Assistance 2: Minimum assistance  Ambulation/Gait Training  Ambulation/Gait Training Performed: Yes  Ambulation/Gait Training 1  Device 1: Rolling walker  Gait Support Devices: Gait belt  Assistance 1: Minimum assistance  Quality of Gait 1: Diminished heel strike, Decreased step length, Inconsistent stride length, Antalgic (step to gait, slow pace)  Comments/Distance (ft) 1: 5 feet bed to chair        Treatment    Cues for safe hand placement with use of FWW for sit<>stand. Instruction provided in step to gait pattern with cues given for sequencing with FWW. Cues given for turning strategy to avoid pivoting on right LE.     Instruction provided in 10 ankle pumps, quad sets, glute sets in order to maximize strength and circulation. Cues given for proper technique and parameters.       Extremity/Trunk Assessments:        RLE   RLE : Exceptions to WFL  AROM RLE (degrees)  RLE AROM Comment: WFL except knee impaired  R Knee Flexion 0-130: 70  R Knee Extension 0-130:  (20 from 0)  Strength RLE  RLE Overall Strength: Greater than or equal to 3/5 as evidenced by functional mobility  LLE   LLE : Within Functional Limits    Outcome Measures:  [facility] Basic Mobility  Turning from your back to your side while in a flat bed without using bedrails: A little  Moving from lying on your back to sitting on the side of a flat bed without using bedrails: A lot  Moving to and from bed to chair (including a wheelchair): A little  Standing up from a chair using your arms (e.g. wheelchair or bedside chair): A lot  To walk in hospital room: A little  Climbing 3-5 steps with railing: A lot  Basic Mobility - Total Score: 15                            Goals:  Encounter Problems       Encounter Problems (Active)       PT Problem       Pt will perform bed mobility with mod I.        Start:  01/24/24     Expected End:  02/07/24            Pt will complete sit <> stand and bed <> chair transfers with mod I.        Start:  01/24/24    Expected End:  02/07/24            Pt will ambulate 300 feet mod I using FWW with no significant gait deviations.         Start:  01/24/24    Expected End:  02/07/24            Pt will ascend/descend at least 2 stairs using rail and cane SBA in order to navigate home environment.         Start:  01/24/24    Expected End:  02/07/24            Pt will verbalize and demonstrate understanding of TKA supine/seated exercises.        Start:  01/24/24    Expected End:  02/07/24                     Education Documentation  Precautions, taught by Jazmine Salazar PT at 1/24/2024  4:10 PM.  Learner: Patient  Readiness: Acceptance  Method: Explanation  Response: Verbalizes Understanding, Needs Reinforcement    Body Mechanics, taught by Jazmine Salazar PT at 1/24/2024  4:10 PM.  Learner: Patient  Readiness: Acceptance  Method: Explanation  Response: Verbalizes Understanding, Needs Reinforcement    Home Exercise Program, taught by Jazmine Salazar PT at 1/24/2024  4:10 PM.  Learner: Patient  Readiness: Acceptance  Method: Explanation  Response: Verbalizes Understanding, Needs Reinforcement    Mobility Training, taught by Jazmine Salazar PT at 1/24/2024  4:10 PM.  Learner: Patient  Readiness: Acceptance  Method: Explanation  Response: Verbalizes Understanding, Needs Reinforcement    Education Comments  No comments found.

## 2024-01-24 NOTE — ANESTHESIA PREPROCEDURE EVALUATION
Patient: Violeta Sterling    Procedure Information       Anesthesia Start Date/Time: 01/24/24 0753    Procedure: Right total knee arthroplasty (Right: Knee)    Location: [facility] OR 09 / [facility]    Surgeons: Karthik Harper MD            Relevant Problems   No relevant active problems       Clinical information reviewed:   Tobacco  Allergies  Meds  Problems  Med Hx  Surg Hx   Fam Hx  Soc   Hx        NPO Detail:  NPO/Void Status  Date of Last Liquid: 01/23/24  Time of Last Liquid: 2100  Date of Last Solid: 01/23/24  Time of Last Solid: 1630  Last Intake Type: Clear fluids  Time of Last Void: 0600         Physical Exam    Airway  Mallampati: II  TM distance: >3 FB  Neck ROM: full     Cardiovascular - normal exam  Rhythm: regular  Rate: normal     Dental - normal exam     Pulmonary - normal exam  Breath sounds clear to auscultation     Abdominal   (+) obese  Abdomen: soft  Bowel sounds: normal           Anesthesia Plan    History of general anesthesia?: yes  History of complications of general anesthesia?: no    ASA 3     general, regional and spinal   (SPINAL FIRST CHOICE, )  The patient is not a current smoker.  Patient was previously instructed to abstain from smoking on day of procedure.  Patient did not smoke on day of procedure.  Education provided regarding risk of obstructive sleep apnea.  intravenous induction   Postoperative administration of opioids is intended.  Anesthetic plan and risks discussed with patient.  Use of blood products discussed with patient who.    Plan discussed with CAA.

## 2024-01-24 NOTE — DISCHARGE INSTRUCTIONS
Call Dr. Harper for any problems and/or concerns.  *Full weight bearing with walker  *Maintain knee precautions  *No pillow under affected knee  *Raise (elevate) your leg above the level of your heart while you are sitting or lying down (place pillow underneath calf)  *You may shower, do not soak or scrub incision; pat dry  *Change mepilex dressing post-operative day #7 to new dressing  *If you have a polar care cooling device, use as instructed; otherwise  Apply ice to affected knee as needed to minimize swelling, on 20 minutes, off 20 minutes  *Move your toes often to avoid stiffness and to lessen swelling  *Avoid sitting for a long time without moving. Get up to take short walks every 1-2 hours. This is important to improve blood flow and breathing. Ask for help if you feel weak or unsteady  *Continue the coughing and deep breathing exercises that you learned in the hospital    Call Doctor right away for:  *Fever of 100.4 or above, shaking chills  *Stiffness, or inability to move the knee  *Increased swelling in your leg  *Increased redness, tenderness, or swelling in or around the knee incision  *Drainage from the knee incision  *Increased knee pain  *An incision that breaks open  *Chest pain/shortness of breath-call 911    After the procedure you can expect pain, swelling, and limited range of motion    Narcotic pain medication may cause constipation. You may need to take actions to prevent or treat constipation, such as:  -drink enough fluid to keep your urine pale yellow  -take over-the-counter or prescription medicines  -eat foods that are high in fiber, such as beans, whole grains, and fresh fruits and vegetables  -limit foods that are high in fat and processed sugars, such as fried or sweet foods    Take your blood thinner (anticoagulant) as prescribed by your physician to prevent blood clots.   If your physician prescribed MAGDY Hose (compression stockings)-wear as instructed as they will help reduce  swelling and the formation of blood clots    Do not use any products that contain nicotine or tobacco, such as cigarettes, e-cigarettes, and chewing tobacco. These can delay healing after surgery. If you need help quitting, ask your health care provider    If you were given a knee immobilizer, please take it home with you and keep it handy for at least 4 weeks just in case any issues arise (i.e. knee buckling)

## 2024-01-24 NOTE — ANESTHESIA PROCEDURE NOTES
Peripheral Block    Patient location during procedure: pre-op  Start time: 1/24/2024 7:19 AM  End time: 1/24/2024 7:28 AM  Reason for block: at surgeon's request and post-op pain management  Staffing  Performed: attending   Authorized by: Jonathan Plasencia MD    Performed by: Jonathan Plasencia MD  Preanesthetic Checklist  Completed: patient identified, IV checked, site marked, risks and benefits discussed, surgical consent, monitors and equipment checked, pre-op evaluation and timeout performed   Timeout performed at: 1/24/2024 7:18 AM  Peripheral Block  Patient position: laying flat  Prep: ChloraPrep  Patient monitoring: heart rate, cardiac monitor and continuous pulse ox  Block type: adductor canal  Laterality: right  Injection technique: single-shot  Guidance: ultrasound guided  Local infiltration: lidocaine  Infiltration strength: 2 %  Dose: 5 mL  Needle  Needle type: pencil-tip   Needle gauge: 21 G  Needle length: 10 cm  Needle localization: ultrasound guidance and anatomical landmarks  Needle insertion depth: 7 cm  Test dose: negative  Assessment  Injection assessment: negative aspiration for heme, no paresthesia on injection, incremental injection and local visualized surrounding nerve on ultrasound  Paresthesia pain: none  Heart rate change: no  Slow fractionated injection: yes  Additional Notes  30 ml of 0.5% Bupivacaine with Epinephrine 1:200 000 mixed with 100 mcg of Clonidine and 10 mg of Decadron. Slow, incremental injection of the mix: 28 ml into the Adductor Canal, the remaining 4 ml used for the Anterior femoral Cutaneous Nerve Field Block with consistently negative aspirations between bouts of injections. Patient tolerated procedure well without complications.

## 2024-01-25 VITALS
OXYGEN SATURATION: 96 % | TEMPERATURE: 97.7 F | HEART RATE: 64 BPM | HEIGHT: 65 IN | BODY MASS INDEX: 45.48 KG/M2 | RESPIRATION RATE: 16 BRPM | WEIGHT: 273 LBS | DIASTOLIC BLOOD PRESSURE: 60 MMHG | SYSTOLIC BLOOD PRESSURE: 130 MMHG

## 2024-01-25 LAB
ANION GAP SERPL CALC-SCNC: 10 MMOL/L (ref 10–20)
ATRIAL RATE: 64 BPM
BUN SERPL-MCNC: 19 MG/DL (ref 6–23)
CALCIUM SERPL-MCNC: 8.9 MG/DL (ref 8.6–10.3)
CHLORIDE SERPL-SCNC: 103 MMOL/L (ref 98–107)
CO2 SERPL-SCNC: 27 MMOL/L (ref 21–32)
CREAT SERPL-MCNC: 0.77 MG/DL (ref 0.5–1.05)
EGFRCR SERPLBLD CKD-EPI 2021: 83 ML/MIN/1.73M*2
ERYTHROCYTE [DISTWIDTH] IN BLOOD BY AUTOMATED COUNT: 14.1 % (ref 11.5–14.5)
GLUCOSE SERPL-MCNC: 123 MG/DL (ref 74–99)
HCT VFR BLD AUTO: 34.8 % (ref 36–46)
HGB BLD-MCNC: 11 G/DL (ref 12–16)
MCH RBC QN AUTO: 28.4 PG (ref 26–34)
MCHC RBC AUTO-ENTMCNC: 31.6 G/DL (ref 32–36)
MCV RBC AUTO: 90 FL (ref 80–100)
NRBC BLD-RTO: 0 /100 WBCS (ref 0–0)
P AXIS: 45 DEGREES
P OFFSET: 200 MS
P ONSET: 145 MS
PLATELET # BLD AUTO: 192 X10*3/UL (ref 150–450)
POTASSIUM SERPL-SCNC: 4.4 MMOL/L (ref 3.5–5.3)
PR INTERVAL: 148 MS
Q ONSET: 219 MS
QRS COUNT: 11 BEATS
QRS DURATION: 110 MS
QT INTERVAL: 406 MS
QTC CALCULATION(BAZETT): 418 MS
QTC FREDERICIA: 414 MS
R AXIS: 31 DEGREES
RBC # BLD AUTO: 3.88 X10*6/UL (ref 4–5.2)
SODIUM SERPL-SCNC: 136 MMOL/L (ref 136–145)
T AXIS: 27 DEGREES
T OFFSET: 422 MS
VENTRICULAR RATE: 64 BPM
WBC # BLD AUTO: 13.8 X10*3/UL (ref 4.4–11.3)

## 2024-01-25 PROCEDURE — 2500000001 HC RX 250 WO HCPCS SELF ADMINISTERED DRUGS (ALT 637 FOR MEDICARE OP): Performed by: ORTHOPAEDIC SURGERY

## 2024-01-25 PROCEDURE — 7100000011 HC EXTENDED STAY RECOVERY HOURLY - NURSING UNIT

## 2024-01-25 PROCEDURE — 97165 OT EVAL LOW COMPLEX 30 MIN: CPT | Mod: GO | Performed by: OCCUPATIONAL THERAPIST

## 2024-01-25 PROCEDURE — 2500000004 HC RX 250 GENERAL PHARMACY W/ HCPCS (ALT 636 FOR OP/ED): Performed by: ORTHOPAEDIC SURGERY

## 2024-01-25 PROCEDURE — 97116 GAIT TRAINING THERAPY: CPT | Mod: GP,CQ

## 2024-01-25 PROCEDURE — 85027 COMPLETE CBC AUTOMATED: CPT | Performed by: ORTHOPAEDIC SURGERY

## 2024-01-25 PROCEDURE — 36415 COLL VENOUS BLD VENIPUNCTURE: CPT | Performed by: ORTHOPAEDIC SURGERY

## 2024-01-25 PROCEDURE — 2500000001 HC RX 250 WO HCPCS SELF ADMINISTERED DRUGS (ALT 637 FOR MEDICARE OP): Performed by: PHYSICIAN ASSISTANT

## 2024-01-25 PROCEDURE — 97110 THERAPEUTIC EXERCISES: CPT | Mod: GP,CQ

## 2024-01-25 PROCEDURE — 80048 BASIC METABOLIC PNL TOTAL CA: CPT | Performed by: ORTHOPAEDIC SURGERY

## 2024-01-25 RX ORDER — ACETAMINOPHEN 325 MG/1
650 TABLET ORAL EVERY 6 HOURS PRN
Qty: 240 TABLET | Refills: 0
Start: 2024-01-25 | End: 2024-02-24

## 2024-01-25 RX ORDER — DOCUSATE SODIUM 100 MG/1
100 CAPSULE, LIQUID FILLED ORAL 2 TIMES DAILY
Qty: 28 CAPSULE | Refills: 0 | Status: SHIPPED | OUTPATIENT
Start: 2024-01-25 | End: 2024-02-08

## 2024-01-25 RX ORDER — TIZANIDINE 2 MG/1
2 TABLET ORAL EVERY 8 HOURS PRN
Qty: 21 TABLET | Refills: 0 | Status: SHIPPED | OUTPATIENT
Start: 2024-01-25 | End: 2024-02-01

## 2024-01-25 RX ORDER — OXYCODONE HYDROCHLORIDE 5 MG/1
5 TABLET ORAL EVERY 6 HOURS PRN
Qty: 28 TABLET | Refills: 0 | Status: SHIPPED | OUTPATIENT
Start: 2024-01-25 | End: 2024-02-01

## 2024-01-25 RX ORDER — ASPIRIN 81 MG/1
81 TABLET ORAL 2 TIMES DAILY
Qty: 60 TABLET | Refills: 0 | Status: SHIPPED | OUTPATIENT
Start: 2024-01-25 | End: 2024-02-24

## 2024-01-25 RX ADMIN — OXYCODONE HYDROCHLORIDE 10 MG: 10 TABLET ORAL at 13:41

## 2024-01-25 RX ADMIN — OXYCODONE HYDROCHLORIDE 10 MG: 10 TABLET ORAL at 08:41

## 2024-01-25 RX ADMIN — ASPIRIN 81 MG: 81 TABLET, COATED ORAL at 08:41

## 2024-01-25 RX ADMIN — CYANOCOBALAMIN TAB 1000 MCG 1000 MCG: 1000 TAB at 08:41

## 2024-01-25 RX ADMIN — ACETAMINOPHEN 650 MG: 325 TABLET ORAL at 05:17

## 2024-01-25 RX ADMIN — OXYCODONE HYDROCHLORIDE 10 MG: 10 TABLET ORAL at 05:17

## 2024-01-25 RX ADMIN — DEXTROSE MONOHYDRATE 3 G: 5 INJECTION INTRAVENOUS at 01:31

## 2024-01-25 RX ADMIN — DOCUSATE SODIUM 100 MG: 100 CAPSULE, LIQUID FILLED ORAL at 08:41

## 2024-01-25 RX ADMIN — ACETAMINOPHEN 650 MG: 325 TABLET ORAL at 11:07

## 2024-01-25 ASSESSMENT — COGNITIVE AND FUNCTIONAL STATUS - GENERAL
CLIMB 3 TO 5 STEPS WITH RAILING: A LITTLE
TOILETING: A LITTLE
WALKING IN HOSPITAL ROOM: A LITTLE
DAILY ACTIVITIY SCORE: 21
HELP NEEDED FOR BATHING: A LITTLE
MOVING FROM LYING ON BACK TO SITTING ON SIDE OF FLAT BED WITH BEDRAILS: A LITTLE
DRESSING REGULAR LOWER BODY CLOTHING: A LITTLE
MOVING TO AND FROM BED TO CHAIR: A LITTLE
TURNING FROM BACK TO SIDE WHILE IN FLAT BAD: A LITTLE
MOBILITY SCORE: 19

## 2024-01-25 ASSESSMENT — PAIN - FUNCTIONAL ASSESSMENT
PAIN_FUNCTIONAL_ASSESSMENT: 0-10

## 2024-01-25 ASSESSMENT — PAIN SCALES - GENERAL
PAINLEVEL_OUTOF10: 7
PAINLEVEL_OUTOF10: 5 - MODERATE PAIN
PAINLEVEL_OUTOF10: 8
PAINLEVEL_OUTOF10: 5 - MODERATE PAIN
PAINLEVEL_OUTOF10: 3

## 2024-01-25 NOTE — PROGRESS NOTES
"Violeta Sterling is a 73 y.o. female on day 0 of admission presenting with Osteoarthritis of right knee, unspecified osteoarthritis type.  Status post right cemented total knee arthroplasty    Subjective   Patient had an uneventful yesterday afternoon and evening.  She reports she was able to ambulate with a walker with physical therapy yesterday evening.  She has adequate pain control.  Tolerating oral diet.  She denies any chest pain, shortness of breath, lightheadedness or dizziness.       Objective     Physical Exam    Last Recorded Vitals  Blood pressure 115/66, pulse 52, temperature 36.7 °C (98.1 °F), temperature source Temporal, resp. rate 18, height 1.651 m (5' 5\"), weight 124 kg (273 lb), SpO2 97 %.  Intake/Output last 3 Shifts:  I/O last 3 completed shifts:  In: 2036.9 (16.4 mL/kg) [P.O.:180; I.V.:1756.9 (14.2 mL/kg); IV Piggyback:100]  Out: 1025 (8.3 mL/kg) [Urine:975 (0.2 mL/kg/hr); Blood:50]  Weight: 123.8 kg     Resting comfortably in no acute distress.  Alert and oriented x 3.  Breath sounds equal bilaterally.  Regular rate and rhythm.  Abdomen soft nontender nondistended.  Right lower extremity dressing taken down.  No significant swelling.  PAS stockings bilateral lower extremities.  No calf pain bilaterally.  Negative Homans' sign bilaterally.  Ankle dorsiflexion, ankle plantarflexion extensor hallux longus motor function 5-5.  Peroneal and tibial sensory distribution are intact.  Dorsalis pedis pulse palpable and symmetric bilaterally.    Relevant Results              Results for orders placed or performed during the hospital encounter of 01/24/24 (from the past 24 hour(s))   CBC   Result Value Ref Range    WBC 13.8 (H) 4.4 - 11.3 x10*3/uL    nRBC 0.0 0.0 - 0.0 /100 WBCs    RBC 3.88 (L) 4.00 - 5.20 x10*6/uL    Hemoglobin 11.0 (L) 12.0 - 16.0 g/dL    Hematocrit 34.8 (L) 36.0 - 46.0 %    MCV 90 80 - 100 fL    MCH 28.4 26.0 - 34.0 pg    MCHC 31.6 (L) 32.0 - 36.0 g/dL    RDW 14.1 11.5 - 14.5 %    " Platelets 192 150 - 450 x10*3/uL               Assessment/Plan   Principal Problem:    Osteoarthritis of right knee, unspecified osteoarthritis type  Postoperative day #1 status post right cemented total knee arthroplasty      Plan:  Complete antibiotics.  Aspirin and mechanical prophylaxis for DVT prophylaxis.  Physical therapy  Probable discharge home later today pending physical therapy sessions.  Will follow-up with [facility] later in the day to determine if disposition to home can occur later today.  Plan to follow-up in my office in 2 to 3 weeks.         Karthik Harper MD

## 2024-01-25 NOTE — CARE PLAN
The patient's goals for the shift include      The clinical goals for the shift include   Pain control  and successfully working with PT

## 2024-01-25 NOTE — PROGRESS NOTES
Physical Therapy    Physical Therapy    Physical Therapy Treatment    Patient Name: Violeta Sterling  MRN: [id number]  Today's Date: 1/25/2024  Time Calculation  Start Time: 0906  Stop Time: 0945  Time Calculation (min): 39 min       Assessment/Plan      PT Plan  Inpatient/Swing Bed or Outpatient: Inpatient  PT Plan  Treatment/Interventions: Bed mobility, Transfer training, Gait training, Balance training, Neuromuscular re-education, Strengthening, Endurance training, Range of motion, Therapeutic exercise, Therapeutic activity, Home exercise program, Stair training  PT Plan: Skilled PT  PT Frequency: BID  PT Discharge Recommendations: Low intensity level of continued care  PT Recommended Transfer Status: Assist x2 (FWW, gait belt)  PT - OK to Discharge: Yes (     01/25/24 0906   PT  Visit   PT Received On 01/25/24   General   Reason for Referral TKA   Referred By Karthik Harper MD   Patient Position Received Bed, 3 rail up   Preferred Learning Style verbal;visual   Precautions   LE Weight Bearing Status Weight Bearing as Tolerated   Pain Assessment   Pain Assessment 0-10   Pain Score 5 - Moderate pain   Pain Type Surgical pain   Pain Location Knee   Pain Orientation Right   Pain Interventions Cold applied   Cognition   Overall Cognitive Status WFL   Therapeutic Exercise   Therapeutic Exercise Performed Yes   Therapeutic Exercise Activity 1 AP,  QS,  GS,  Hip Abduction,  Marchin,  LAQ,  Ball Squeezes  x 20 B with emphasis on R LE   Therapeutic Exercise Activity 2 ROM  (Right Knee 0-85)   Bed Mobility   Bed Mobility Yes   Bed Mobility 1   Bed Mobility 1 Supine to sitting;Sitting to supine   Level of Assistance 1 Contact guard   Bed Mobility Comments 1 slow to transition   Ambulation/Gait Training   Ambulation/Gait Training Performed Yes   Ambulation/Gait Training 1   Surface 1 Level tile   Device 1 Rolling walker   Gait Support Devices Gait belt   Assistance 1 Contact guard   Comments/Distance (ft) 1 150 x 2  (Patient  demos grossly steady reciprocating steps with good heel strike toe off pattern)   Transfers   Transfer Yes   Transfers 2   Transfer From 2 Chair with arms to   Technique 2 Sit to stand;Stand to sit   Transfer Device 2 Walker   Transfer Level of Assistance 2 Close supervision   Trials/Comments 2 x3   Stairs   Stairs Yes   Stairs   Rails 1 Right   Device 1 Single point cane   Support Devices 1 Gait belt   Assistance 1 Contact guard   Comment/Number of Steps 1 3   PT Plan   Inpatient/Swing Bed or Outpatient Inpatient     Outcome Measures:  [facility] Basic Mobility  Turning from your back to your side while in a flat bed without using bedrails: A little  Moving from lying on your back to sitting on the side of a flat bed without using bedrails: A little  Moving to and from bed to chair (including a wheelchair): A little  Standing up from a chair using your arms (e.g. wheelchair or bedside chair): None  To walk in hospital room: A little  Climbing 3-5 steps with railing: A little  Basic Mobility - Total Score: 19                             EDUCATION:     Education Documentation  No documentation found.  Education Comments  No comments found.        GOALS:  Encounter Problems       Encounter Problems (Active)       Balance       STG-Patient will be independent with assistive device dynamic stand task >5 minutes for ADL completion   (Progressing)       Start:  01/25/24    Expected End:  02/08/24               Mobility       STG-Patient will be independent with assistive device functional mobility tasks   (Progressing)       Start:  01/25/24    Expected End:  02/08/24               PT Problem       Pt will perform bed mobility with mod I.  (Progressing)       Start:  01/24/24    Expected End:  01/31/24            Pt will complete sit <> stand and bed <> chair transfers with mod I.  (Progressing)       Start:  01/24/24    Expected End:  01/31/24            Pt will ambulate 300 feet mod I using FWW with no significant gait  deviations.   (Progressing)       Start:  01/24/24    Expected End:  01/31/24            Pt will ascend/descend at least 2 stairs using rail and cane SBA in order to navigate home environment.   (Progressing)       Start:  01/24/24    Expected End:  01/31/24            Pt will verbalize and demonstrate understanding of TKA supine/seated exercises.  (Progressing)       Start:  01/24/24    Expected End:  01/31/24                   Transfers       STG - Patient will perform car transfers independently demonstrating good safety (Progressing)       Start:  01/25/24    Expected End:  02/08/24            STG-Patient will be independent with functional transfers demonstrating good safety   (Progressing)       Start:  01/25/24    Expected End:  02/08/24

## 2024-01-25 NOTE — CARE PLAN
Problem: Safety - Adult  Goal: Free from fall injury  Outcome: Progressing     Problem: Discharge Planning  Goal: Discharge to home or other facility with appropriate resources  Outcome: Progressing     Problem: Chronic Conditions and Co-morbidities  Goal: Patient's chronic conditions and co-morbidity symptoms are monitored and maintained or improved  Outcome: Progressing   The patient's goals for the shift include      The clinical goals for the shift include Pt will have minimal pain this shift, pt will receive adequate rest this shift.    Over the shift, the patient did have minimal pain and received adequate rest this shift.

## 2024-01-25 NOTE — PROGRESS NOTES
01/25/24 0934   Discharge Planning   Living Arrangements Alone   Support Systems Family members   Type of Residence Private residence   Home or Post Acute Services In home services   Type of Home Care Services Home OT;Home PT   Patient expects to be discharged to: Home with [facility]   Does the patient need discharge transport arranged? No     Met with patient at bedside. Admitted for right knee replacement. Pt lives alone and was independent PTA with no HHC. Pt has a walker. Pt was able to drive and obtain medications. Therapy evals pending. Pt plans to return home with [facility] PT/OT. Pt has number to contact [facility] when she arrives home. Pts sister will be staying with her during recovery. Family will provide transport home.

## 2024-01-25 NOTE — NURSING NOTE
Discharge instructions given to patient who verbalized understanding; saline lock dc' and pressure drsg applied; mepilex given to pt and discharged with her walker    Discharged to home with her  per wheelchair  in NAD

## 2024-01-25 NOTE — NURSING NOTE
0130: Pt heart rate-52 this RN notified Ashlee HAMILTON, pt resting comfortably in bed. No new orders at this time, will continue to monitor pt.

## 2024-01-25 NOTE — PROGRESS NOTES
Occupational Therapy    Evaluation    Patient Name: Violeta Sterling  MRN: [id number]  Today's Date: 1/25/2024  Time Calculation  Start Time: 1015  Stop Time: 1036  Time Calculation (min): 21 min  Eval only     Assessment  IP OT Assessment  Prognosis: Good  Evaluation/Treatment Tolerance: Patient tolerated treatment well  Medical Staff Made Aware: Yes  End of Session Communication: Bedside nurse  End of Session Patient Position: Up in chair, Alarm on  Patient presents with decline in ADLs, functional transfers, functional mobility and would benefit from OT during acute stay to improve functional independence and safety. Recommend low intensity OT to maximize functional independence and safety.     Plan:  Treatment Interventions: ADL retraining, Functional transfer training, Patient/family training, Equipment evaluation/education, Compensatory technique education  OT Frequency: Daily  OT Discharge Recommendations: Low intensity level of continued care  Equipment Recommended upon Discharge: Wheeled walker (shower chair, reacher, sock aide, LH shoe horn)  OT - OK to Discharge: Yes from acute care OT services to the next level of care when cleared by medical team      Subjective   Current Problem:  No diagnosis found.    General:  General  Reason for Referral: right TKA  Referred By: Dr Harper  Past Medical History Relevant to Rehab: Admitted 1/24/24 following completion of right TKA.  Patient Position Received: Up in chair, Alarm off, not on at start of session  Preferred Learning Style: verbal  General Comment: Patient seated in bedside chair and agreeable to participate in OT evaluation.    Precautions:  LE Weight Bearing Status: Weight Bearing as Tolerated  Medical Precautions: Fall precautions    Pain:  Pain Assessment  Pain Assessment: 0-10  Pain Score: 5 - Moderate pain  Pain Type: Surgical pain  Pain Location: Knee  Pain Orientation: Right  Pain Interventions: Rest    Objective   Cognition:  Overall Cognitive  Status: Within Functional Limits    Home Living:  Home Living Comments: Lives in 2 story home with 2 BUBBA.  Will be staying on the first floor.  Has tub shower with  and shower chair.  Owns [facility]     Prior Function:  Prior Function Comments: Was independent with all ADLs and IADLs.  Will have assist at home. Owns reacher, sock aide, and LH shoe horn    ADL:  UE Dressing Assistance: Independent (don pull over shirt)  LE Dressing Assistance: Minimal (don underwear and pants)  ADL Comments: Educated patient on safety and compensatory strategies for lower body dressing and patient able to provide return demonstration.    Activity Tolerance:  Endurance: Endurance does not limit participation in activity    Bed Mobility/Transfers:   Transfers  Transfer:  (SBA sit <>stand with min verbal cues for proper hand placement and technique)  Educated patient on safety with car transfers and patient verbalized understanding.      Extremities: RUE   RUE : Within Functional Limits and LUE   LUE: Within Functional Limits    Outcome Measures: [facility] Daily Activity  Putting on and taking off regular lower body clothing: A little  Bathing (including washing, rinsing, drying): A little  Putting on and taking off regular upper body clothing: None  Toileting, which includes using toilet, bedpan or urinal: A little  Taking care of personal grooming such as brushing teeth: None  Eating Meals: None  Daily Activity - Total Score: 21    EDUCATION:  Education  Individual(s) Educated: Patient  Education Provided: Fall precautons (safety and comp strategies with LB dressing)  Patient Response to Education: Patient/Caregiver Verbalized Understanding of Information      Goals:   Encounter Problems       Encounter Problems (Active)       Balance       STG-Patient will be independent with assistive device dynamic stand task >5 minutes for ADL completion   (Progressing)       Start:  01/25/24    Expected End:  02/08/24               Dressings Lower  Extremities       STG - Patient will complete lower body dressing independently with comp strategies and AE  (Progressing)       Start:  01/25/24    Expected End:  02/08/24               Mobility       STG-Patient will be independent with assistive device functional mobility tasks   (Progressing)       Start:  01/25/24    Expected End:  02/08/24               Transfers       STG - Patient will perform car transfers independently demonstrating good safety (Progressing)       Start:  01/25/24    Expected End:  02/08/24            STG-Patient will be independent with functional transfers demonstrating good safety   (Progressing)       Start:  01/25/24    Expected End:  02/08/24

## 2024-01-25 NOTE — PROGRESS NOTES
Physical Therapy    Physical Therapy    Physical Therapy Treatment    Patient Name: Violeta Sterling  MRN: [id number]  Today's Date: 1/25/2024  Time Calculation  Start Time: 1332  Stop Time: 1404  Time Calculation (min): 32 min       Assessment/Plan   PT Assessment  PT Assessment Results: Decreased strength, Decreased range of motion, Decreased mobility  Rehab Prognosis: Excellent  End of Session Communication: Bedside nurse  End of Session Patient Position: Up in chair, Alarm on  PT Plan  Inpatient/Swing Bed or Outpatient: Inpatient  PT Plan  Treatment/Interventions: Bed mobility, Transfer training, Gait training, Balance training, Neuromuscular re-education, Strengthening, Endurance training, Range of motion, Therapeutic exercise, Therapeutic activity, Home exercise program, Stair training  PT Plan: Skilled PT  PT Frequency: BID  PT Discharge Recommendations: Low intensity level of continued care  PT Recommended Transfer Status: Assist x2 (FWW, gait belt)  PT - OK to Discharge: Yes      01/25/24 1332   PT  Visit   PT Received On 01/25/24   Response to Previous Treatment Patient with no complaints from previous session.   General   Reason for Referral TKA   Referred By Karthik Harper MD   Patient Position Received Bed, 3 rail up   Preferred Learning Style verbal;visual   Precautions   LE Weight Bearing Status Weight Bearing as Tolerated   Therapeutic Exercise   Therapeutic Exercise Performed Yes   Therapeutic Exercise Activity 1 AP,  QS,  GS,  Hip Abduction,  Marchin,  LAQ,  Ball Squeezes  x 20 B with emphasis on R LE   Therapeutic Exercise Activity 2 ROM  (Right Knee 0-85)   Bed Mobility   Bed Mobility Yes   Bed Mobility 1   Bed Mobility 1 Supine to sitting;Sitting to supine   Level of Assistance 1 Contact guard   Ambulation/Gait Training   Ambulation/Gait Training Performed Yes   Ambulation/Gait Training 1   Surface 1 Level tile   Device 1 Rolling walker   Gait Support Devices Gait belt   Assistance 1 Contact guard    Quality of Gait 1 Diminished heel strike;Decreased step length;Inconsistent stride length;Antalgic   Comments/Distance (ft) 1 200  (Patinet dmoe sgrossly steady reciprocaitng steps  with slower cadenece)   Transfers   Transfer Yes   Transfers 2   Transfer From 2 Chair with arms to   Technique 2 Sit to stand;Stand to sit   Transfer Level of Assistance 2 Close supervision   Stairs   Stairs Yes   PT Assessment   PT Assessment Results Decreased strength;Decreased range of motion;Decreased mobility   Rehab Prognosis Excellent   End of Session Communication Bedside nurse   End of Session Patient Position Up in chair;Alarm on   PT Plan   Inpatient/Swing Bed or Outpatient Inpatient     Outcome Measures:  [facility] Basic Mobility  Turning from your back to your side while in a flat bed without using bedrails: A little  Moving from lying on your back to sitting on the side of a flat bed without using bedrails: A little  Moving to and from bed to chair (including a wheelchair): A little  Standing up from a chair using your arms (e.g. wheelchair or bedside chair): None  To walk in hospital room: A little  Climbing 3-5 steps with railing: A little  Basic Mobility - Total Score: 19                             EDUCATION:     Education Documentation  No documentation found.  Education Comments  No comments found.        GOALS:  Encounter Problems       Encounter Problems (Active)       Balance       STG-Patient will be independent with assistive device dynamic stand task >5 minutes for ADL completion   (Progressing)       Start:  01/25/24    Expected End:  02/08/24               Mobility       STG-Patient will be independent with assistive device functional mobility tasks   (Progressing)       Start:  01/25/24    Expected End:  02/08/24               PT Problem       Pt will perform bed mobility with mod I.  (Progressing)       Start:  01/24/24    Expected End:  01/31/24            Pt will complete sit <> stand and bed <> chair  transfers with mod I.  (Progressing)       Start:  01/24/24    Expected End:  01/31/24            Pt will ambulate 300 feet mod I using FWW with no significant gait deviations.   (Progressing)       Start:  01/24/24    Expected End:  01/31/24            Pt will ascend/descend at least 2 stairs using rail and cane SBA in order to navigate home environment.   (Progressing)       Start:  01/24/24    Expected End:  01/31/24            Pt will verbalize and demonstrate understanding of TKA supine/seated exercises.  (Progressing)       Start:  01/24/24    Expected End:  01/31/24                   Transfers       STG - Patient will perform car transfers independently demonstrating good safety (Progressing)       Start:  01/25/24    Expected End:  02/08/24            STG-Patient will be independent with functional transfers demonstrating good safety   (Progressing)       Start:  01/25/24    Expected End:  02/08/24

## (undated) DEVICE — IRRIGATION SYSTEM, WOUND, SURGIPHOR, 450ML, STERILE

## (undated) DEVICE — WOUND SYSTEM, DEBRIDEMENT & CLEANING, O.R DUOPAK

## (undated) DEVICE — GLOVE, SURGICAL, PROTEXIS PI MICRO, 7.5, PF, LF

## (undated) DEVICE — SYRINGE, 60 CC, LUER LOCK, MONOJECT, W/O CAP, LF

## (undated) DEVICE — SUTURE, VICRYL, 2-0, 36 IN, CT-1, UNDYED

## (undated) DEVICE — SOLUTION, IRRIGATION, STERILE WATER, 1000 ML, POUR BOTTLE

## (undated) DEVICE — SUTURE, ETHIBOND EXCEL 1, TAPER POINT CT-1 GREEN 30 INCH

## (undated) DEVICE — GLOVE, SURGICAL, PROTEXIS PI , 7.5, PF, LF

## (undated) DEVICE — TRAY, SURESTEP, SILICONE DRAINAGE BAG, STATLOCK, 16FR

## (undated) DEVICE — BANDAGE, COMPRESSION, W/CLIP, FLEX-MASTER, DOUBLE LENGTH, 6 IN X 11 YD, LF

## (undated) DEVICE — TOWEL PACK, STERILE, 4/PACK, BLUE

## (undated) DEVICE — NEEDLE, SPINAL, QUINCKE, 18 G X 3.5 IN, PINK HUB

## (undated) DEVICE — CURRETTE, CURVED, 6MM

## (undated) DEVICE — MIXER, CEMENT, MIXEVAC III HIGH VACUUM KIT, STERILE

## (undated) DEVICE — Device

## (undated) DEVICE — POSITIONER, LEG, DISPOSABLE

## (undated) DEVICE — HOOD, STERISHIELD T4 SYSTEM

## (undated) DEVICE — BLADE, SAW, SAGITTAL, 25.0 X 1.27 X 90MM

## (undated) DEVICE — SUTURE, STARTAFIX, SYMMETRIC, PDS PLUS, 60CM CT-1

## (undated) DEVICE — TISSUE ADHESIVE, PREMIERPRO EXOFIN, PRECISION PEN HV, 1.0ML

## (undated) DEVICE — SUTURE, STRATAFIX, SPIRAL MONOCRYL PLUS, 3-0, PS-2 45CM, UNDYED

## (undated) DEVICE — INTERPULSE HANDPIECE SET W/ 10FT SUCTION TUBING

## (undated) DEVICE — SOLUTION, IRRIGATION, SODIUM CHLORIDE 0.9%, 1000 ML, POUR BOTTLE

## (undated) DEVICE — DRESSING, MEPILEX BORDER, POST-OP AG, 4 X 10 IN

## (undated) DEVICE — BANDAGE, COFLEX, 6 X 5 YDS, FOAM TAN, STERILE, LF

## (undated) DEVICE — SUTURE, VICRYL, 1, 27 IN, CP-1, UNDYED

## (undated) DEVICE — DRAPE, INSTRUMENT, W/POUCH, STERI DRAPE, 7 X 11 IN, DISPOSABLE, STERILE